# Patient Record
Sex: FEMALE | Race: WHITE | Employment: OTHER | URBAN - METROPOLITAN AREA
[De-identification: names, ages, dates, MRNs, and addresses within clinical notes are randomized per-mention and may not be internally consistent; named-entity substitution may affect disease eponyms.]

---

## 2017-01-21 ENCOUNTER — OFFICE VISIT (OUTPATIENT)
Dept: FAMILY MEDICINE CLINIC | Facility: CLINIC | Age: 27
End: 2017-01-21

## 2017-01-21 VITALS
DIASTOLIC BLOOD PRESSURE: 72 MMHG | WEIGHT: 291.81 LBS | OXYGEN SATURATION: 98 % | SYSTOLIC BLOOD PRESSURE: 114 MMHG | HEIGHT: 67 IN | HEART RATE: 72 BPM | RESPIRATION RATE: 16 BRPM | TEMPERATURE: 98 F | BODY MASS INDEX: 45.8 KG/M2

## 2017-01-21 DIAGNOSIS — J01.01 ACUTE RECURRENT MAXILLARY SINUSITIS: Primary | ICD-10-CM

## 2017-01-21 PROCEDURE — 99213 OFFICE O/P EST LOW 20 MIN: CPT | Performed by: NURSE PRACTITIONER

## 2017-01-21 RX ORDER — AMOXICILLIN 875 MG/1
875 TABLET, COATED ORAL 2 TIMES DAILY
Qty: 20 TABLET | Refills: 0 | Status: SHIPPED | OUTPATIENT
Start: 2017-01-21 | End: 2017-01-31

## 2017-01-21 NOTE — PROGRESS NOTES
CHIEF COMPLAINT:   Patient presents with:  Sinusitis: SINUS PRESSURE, HA , COUGH, SORE THROAT  X 2 WKS       HPI:   Adalberto Rocha is a 31year old female who presents for sinus congestion for  2  weeks. Symptoms have been worsening since onset. Smoking Status: Never Smoker                      Alcohol Use: No                  REVIEW OF SYSTEMS:   GENERAL: feels well otherwise, no unplanned weight change and normal appetite  SKIN: no rashes or abnormal skin lesions  HEENT: See HPI.     LUNGS: den Sig: Take 1 tablet (875 mg total) by mouth 2 (two) times daily. Risks, benefits, side effects of medication addressed and explained. Patient Instructions     Sinus Headache    The sinuses are air-filled spaces within the bones of the face. · You may use over-the-counter decongestants unless a similar medicine was prescribed. Nasal sprays or drops work the fastest. Use one that contains phenylephrine or oxymetazoline. First blow your nose gently to remove mucus. Then apply the spray or drops. · You may use over-the-counter medicine to control pain and fever, unless another pain medicine was prescribed. Talk with your doctor before using acetaminophen or ibuprofen if you have chronic liver or kidney disease.  Also talk with your doctor if you hav

## 2017-01-21 NOTE — PATIENT INSTRUCTIONS
Sinus Headache    The sinuses are air-filled spaces within the bones of the face. They connect to the inside of the nose. Sinusitis is an inflammation of the tissue lining the sinus cavity.  Sinus inflammation can occur during a cold or hay fever (allergi · You may use over-the-counter decongestants unless a similar medicine was prescribed. Nasal sprays or drops work the fastest. Use one that contains phenylephrine or oxymetazoline. First blow your nose gently to remove mucus. Then apply the spray or drops. · You may use over-the-counter medicine to control pain and fever, unless another pain medicine was prescribed. Talk with your doctor before using acetaminophen or ibuprofen if you have chronic liver or kidney disease.  Also talk with your doctor if you hav

## 2017-02-01 ENCOUNTER — OFFICE VISIT (OUTPATIENT)
Dept: FAMILY MEDICINE CLINIC | Facility: CLINIC | Age: 27
End: 2017-02-01

## 2017-02-01 VITALS
HEIGHT: 67 IN | SYSTOLIC BLOOD PRESSURE: 130 MMHG | RESPIRATION RATE: 19 BRPM | HEART RATE: 100 BPM | DIASTOLIC BLOOD PRESSURE: 70 MMHG | BODY MASS INDEX: 45.67 KG/M2 | WEIGHT: 291 LBS | TEMPERATURE: 98 F | OXYGEN SATURATION: 99 %

## 2017-02-01 DIAGNOSIS — J01.00 ACUTE MAXILLARY SINUSITIS, RECURRENCE NOT SPECIFIED: Primary | ICD-10-CM

## 2017-02-01 PROCEDURE — 99213 OFFICE O/P EST LOW 20 MIN: CPT | Performed by: NURSE PRACTITIONER

## 2017-02-01 RX ORDER — DOXYCYCLINE HYCLATE 100 MG/1
100 CAPSULE ORAL 2 TIMES DAILY
Qty: 20 CAPSULE | Refills: 0 | Status: SHIPPED | OUTPATIENT
Start: 2017-02-01 | End: 2017-02-11

## 2017-02-01 NOTE — PROGRESS NOTES
CHIEF COMPLAINT:   Patient presents with:  Sinus Problem: seen 1/21, not feeling better       HPI:   Eusebia Cordova is a 31year old female who presents for sinus congestion for  3  weeks. Symptoms have been consistent since onset.  Amoxicillin no Smoking Status: Never Smoker                      Alcohol Use: No                  REVIEW OF SYSTEMS:   GENERAL: feels well otherwise, no unplanned weight change,  Good appetite  SKIN: no rashes or abnormal skin lesions  HEENT: See HPI.     LUNGS: denies Meds & Refills for this Visit:    Signed Prescriptions Disp Refills    Doxycycline Hyclate 100 MG Oral Cap 20 capsule 0      Sig: Take 1 capsule (100 mg total) by mouth 2 (two) times daily.            Risks, benefits, side effects of medication addressed · Female only: If taking birth control - Use back up birth control for pregnancy prevention for up to 1 month due to antibiotic use                Sinusitis (Antibiotic Treatment)    The sinuses are air-filled spaces within the bones of the face.  They conn · Over-the-counter antihistamines may help if allergies contributed to your sinusitis.    · Do not use nasal rinses or irrigation during an acute sinus infection, unless told to by your health care provider.  Rinsing may spread the infection to other sinuse

## 2017-02-19 ENCOUNTER — OFFICE VISIT (OUTPATIENT)
Dept: FAMILY MEDICINE CLINIC | Facility: CLINIC | Age: 27
End: 2017-02-19

## 2017-02-19 VITALS
SYSTOLIC BLOOD PRESSURE: 112 MMHG | RESPIRATION RATE: 20 BRPM | HEIGHT: 67 IN | OXYGEN SATURATION: 99 % | TEMPERATURE: 98 F | BODY MASS INDEX: 45.67 KG/M2 | DIASTOLIC BLOOD PRESSURE: 68 MMHG | WEIGHT: 291 LBS | HEART RATE: 85 BPM

## 2017-02-19 DIAGNOSIS — L20.9 ATOPIC DERMATITIS, UNSPECIFIED TYPE: Primary | ICD-10-CM

## 2017-02-19 PROCEDURE — 99213 OFFICE O/P EST LOW 20 MIN: CPT | Performed by: NURSE PRACTITIONER

## 2017-02-19 NOTE — PROGRESS NOTES
CHIEF COMPLAINT:   Patient presents with:  Derm Problem: itchy dry patches, worst when in water on Left hand at knuckles/ arm  for 1 month  Redness         HPI:   Elizabeth Farr is a 31year old female who presents for evaluation of a rash.   Per Problem Relation Age of Onset   • Cancer Father      lung ca,  at 47   • Diabetes Neg    • Heart Disorder Neg    • Hypertension Neg    • Obesity Mother    • asthma[other] [OTHER] Mother         Smoking Status: Never Smoker                      Alcohol Meds & Refills for this Visit:    Signed Prescriptions Disp Refills    hydrocortisone 2.5 % External Cream 28 g 0      Sig: Apply 1 Application topically 2 (two) times daily. Risk and benefits of medication discussed.        Patient Instructions · Use oral diphenhydramine to help reduce itching. This is an antihistamine you can buy at drug and grocery stores. It can make you sleepy, so use lower doses during the daytime. Or you can use loratadine.  This is an antihistamine that will not make you sl

## 2017-06-05 ENCOUNTER — OFFICE VISIT (OUTPATIENT)
Dept: FAMILY MEDICINE CLINIC | Facility: CLINIC | Age: 27
End: 2017-06-05

## 2017-06-05 VITALS
BODY MASS INDEX: 45.67 KG/M2 | WEIGHT: 291 LBS | OXYGEN SATURATION: 98 % | DIASTOLIC BLOOD PRESSURE: 52 MMHG | RESPIRATION RATE: 20 BRPM | SYSTOLIC BLOOD PRESSURE: 104 MMHG | TEMPERATURE: 98 F | HEIGHT: 67 IN | HEART RATE: 90 BPM

## 2017-06-05 DIAGNOSIS — J01.11 ACUTE RECURRENT FRONTAL SINUSITIS: Primary | ICD-10-CM

## 2017-06-05 PROCEDURE — 99213 OFFICE O/P EST LOW 20 MIN: CPT | Performed by: NURSE PRACTITIONER

## 2017-06-05 RX ORDER — AMOXICILLIN 875 MG/1
875 TABLET, COATED ORAL 2 TIMES DAILY
Qty: 20 TABLET | Refills: 0 | Status: SHIPPED | OUTPATIENT
Start: 2017-06-05 | End: 2017-06-15

## 2017-06-05 NOTE — PROGRESS NOTES
HPI:   Fransisca Mcmanus is a 31year old female who presents with ill symptoms for  10  days. Patient reports sore throat, congestion, sinus pain, OTC cold meds have not been helping, prior history of sinusitis, denies fever, denies cough.  Fortunato pereira normal  NEURO: admits to headaches    EXAM:   /52 mmHg  Pulse 90  Temp(Src) 97.7 °F (36.5 °C) (Oral)  Resp 20  Ht 67\"  Wt 291 lb  BMI 45.57 kg/m2  SpO2 98%  LMP 05/17/2017  GENERAL: well developed, well nourished,in no apparent distress  HEENT: atra 2 weeks with primary care if not better or sooner if worsening symptoms. Seek immediate care if inability to swallow or breathe. ·       The patient indicates understanding of these issues and agrees to the plan.

## 2017-06-05 NOTE — PATIENT INSTRUCTIONS
·  PLAN: Amoxicillin, take as directed. Finish all the medication even if you feel better. · Probiotics or yogurt daily during antibiotic use will help decrease stomach upset and restore good bacteria to the gut.   · Use Flonase or other steroid nasal spr

## 2017-07-05 ENCOUNTER — OFFICE VISIT (OUTPATIENT)
Dept: FAMILY MEDICINE CLINIC | Facility: CLINIC | Age: 27
End: 2017-07-05

## 2017-07-05 DIAGNOSIS — J01.11 ACUTE RECURRENT FRONTAL SINUSITIS: Primary | ICD-10-CM

## 2017-07-05 DIAGNOSIS — J01.01 ACUTE RECURRENT MAXILLARY SINUSITIS: ICD-10-CM

## 2017-07-05 PROCEDURE — 99213 OFFICE O/P EST LOW 20 MIN: CPT | Performed by: NURSE PRACTITIONER

## 2017-07-05 RX ORDER — CEPHALEXIN 500 MG/1
500 CAPSULE ORAL 4 TIMES DAILY
Qty: 40 CAPSULE | Refills: 0 | Status: SHIPPED | OUTPATIENT
Start: 2017-07-05 | End: 2017-08-22 | Stop reason: ALTCHOICE

## 2017-07-05 NOTE — PATIENT INSTRUCTIONS
Understanding Sinus Problems    You don’t often think about your sinuses until there’s a problem. One day you realize you can’t smell dinner cooking. Or you find you often have headaches or problems breathing through your nose.   Symptoms of sinus problem

## 2017-07-07 VITALS
RESPIRATION RATE: 16 BRPM | SYSTOLIC BLOOD PRESSURE: 120 MMHG | DIASTOLIC BLOOD PRESSURE: 72 MMHG | TEMPERATURE: 98 F | HEART RATE: 68 BPM | OXYGEN SATURATION: 98 %

## 2017-07-07 NOTE — PROGRESS NOTES
CHIEF COMPLAINT:   \" Patient presents with:  Sinusitis: Persistent    HPI:   Lynn Reynoso is a 28year old female who was seen in this clinic on 6/26/17 and prescribed Amoxicillin for an Acute Sinusitis.   Pt states that she started to feel bet lung ca,  at 46   • Diabetes Neg    • Heart Disorder Neg    • Hypertension Neg    • Obesity Mother    • asthma[other] [OTHER] Mother       Smoking status: Never Smoker                                                              Smokeless tobacco: Disp Refills    cephALEXin 500 MG Oral Cap 40 capsule 0      Sig: Take 1 capsule (500 mg total) by mouth 4 (four) times daily.            Imaging & Consults:  None

## 2017-08-22 ENCOUNTER — OFFICE VISIT (OUTPATIENT)
Dept: FAMILY MEDICINE CLINIC | Facility: CLINIC | Age: 27
End: 2017-08-22

## 2017-08-22 VITALS
HEART RATE: 69 BPM | TEMPERATURE: 98 F | OXYGEN SATURATION: 98 % | WEIGHT: 286.38 LBS | HEIGHT: 67 IN | DIASTOLIC BLOOD PRESSURE: 66 MMHG | SYSTOLIC BLOOD PRESSURE: 110 MMHG | BODY MASS INDEX: 44.95 KG/M2

## 2017-08-22 DIAGNOSIS — J30.9 ALLERGIC RHINITIS, UNSPECIFIED CHRONICITY, UNSPECIFIED SEASONALITY, UNSPECIFIED TRIGGER: ICD-10-CM

## 2017-08-22 DIAGNOSIS — J34.89 SINUS PRESSURE: Primary | ICD-10-CM

## 2017-08-22 PROCEDURE — 99213 OFFICE O/P EST LOW 20 MIN: CPT | Performed by: NURSE PRACTITIONER

## 2017-08-22 RX ORDER — FLUTICASONE PROPIONATE 50 MCG
2 SPRAY, SUSPENSION (ML) NASAL DAILY
COMMUNITY
End: 2020-01-14 | Stop reason: ALTCHOICE

## 2017-08-22 RX ORDER — FLUTICASONE PROPIONATE 50 MCG
1 SPRAY, SUSPENSION (ML) NASAL 2 TIMES DAILY
Qty: 1 BOTTLE | Refills: 1 | Status: SHIPPED | OUTPATIENT
Start: 2017-08-22 | End: 2017-09-21

## 2017-08-22 RX ORDER — LORATADINE 10 MG/1
10 TABLET ORAL DAILY
Qty: 90 TABLET | Refills: 0 | Status: SHIPPED | OUTPATIENT
Start: 2017-08-22 | End: 2017-11-20

## 2017-08-22 NOTE — PATIENT INSTRUCTIONS
· Please start Flonase 1 spray each nostril twice daily before brushing teeth. Use for at least one to two weeks. May stop if improving. Restart if symptoms return. · Start Loratadine 10 mg daily for at least two weeks.    · Use saline nasal spray during t

## 2017-08-22 NOTE — PROGRESS NOTES
HPI:   Deepthi Arellano is a 28year old female who presents with ill symptoms for  1  weeks. Patient has been seen for sinus pressure twice in the past two months with no relief with antibiotic use.  Today she reports sinus pressure over left eye, Used                      Alcohol use:  No                  REVIEW OF SYSTEMS:   GENERAL: feels well otherwise, fatigue as above  HEENT: congested, as above in HPI  LUNGS: denies shortness of breath with exertion  CARDIOVASCULAR: denies chest pain on exerti needed to help blow nose easily. · Use warm pack to face where pressure is to help with pain. · May take Tylenol 650 mg every six hours as needed for pain. · Avoid decongestants as they may be too drying.  Use salt water gargles as discussed to remove p

## 2017-08-25 ENCOUNTER — CHARTING TRANS (OUTPATIENT)
Dept: OTHER | Age: 27
End: 2017-08-25

## 2017-11-20 ENCOUNTER — OFFICE VISIT (OUTPATIENT)
Dept: FAMILY MEDICINE CLINIC | Facility: CLINIC | Age: 27
End: 2017-11-20

## 2017-11-20 VITALS
OXYGEN SATURATION: 98 % | RESPIRATION RATE: 20 BRPM | TEMPERATURE: 98 F | BODY MASS INDEX: 43.35 KG/M2 | HEART RATE: 81 BPM | WEIGHT: 286 LBS | DIASTOLIC BLOOD PRESSURE: 64 MMHG | SYSTOLIC BLOOD PRESSURE: 108 MMHG | HEIGHT: 68 IN

## 2017-11-20 DIAGNOSIS — J01.40 ACUTE PANSINUSITIS, RECURRENCE NOT SPECIFIED: Primary | ICD-10-CM

## 2017-11-20 PROCEDURE — 99213 OFFICE O/P EST LOW 20 MIN: CPT | Performed by: NURSE PRACTITIONER

## 2017-11-20 RX ORDER — AMOXICILLIN AND CLAVULANATE POTASSIUM 875; 125 MG/1; MG/1
1 TABLET, FILM COATED ORAL 2 TIMES DAILY
Qty: 20 TABLET | Refills: 0 | Status: SHIPPED | OUTPATIENT
Start: 2017-11-20 | End: 2017-11-30

## 2017-11-21 NOTE — PROGRESS NOTES
CHIEF COMPLAINT:   Patient presents with:  Sinus Problem: s/s for 2-3 weeks      HPI:   Juan Rodriguez is a 28year old female who presents for cold symptoms for  3  weeks.  Symptoms have progressed into sinus congestion and been worsening since o Comment: w/ adenoidectomy   Family History   Problem Relation Age of Onset   • Cancer Father      lung ca,  at 47   • Diabetes Neg    • Heart Disorder Neg    • Hypertension Neg    • Obesity Mother    • asthma[other] [OTHER] Mother       Smoking sta PLAN: Meds as below.   Comfort care instructions as listed in Patient Instructions    Meds & Refills for this Visit:    Signed Prescriptions Disp Refills    Amoxicillin-Pot Clavulanate 875-125 MG Oral Tab 20 tablet 0      Sig: Take 1 tablet by mouth 2 (two) · Over-the-counter decongestants may be used unless a similar medicine was prescribed. Nasal sprays work the fastest. Use one that contains phenylephrine or oxymetazoline. First blow the nose gently. Then use the spray.  Do not use these medicines more ofte © 5740-8019 [employer] [address] [facility], [facility] [place]. All rights reserved. This information is not intended as a substitute for professional medical care. Always follow your healthcare professional's instructions.             The

## 2018-01-06 ENCOUNTER — OFFICE VISIT (OUTPATIENT)
Dept: FAMILY MEDICINE CLINIC | Facility: CLINIC | Age: 28
End: 2018-01-06

## 2018-01-06 VITALS
OXYGEN SATURATION: 98 % | TEMPERATURE: 98 F | HEART RATE: 86 BPM | SYSTOLIC BLOOD PRESSURE: 110 MMHG | DIASTOLIC BLOOD PRESSURE: 68 MMHG | BODY MASS INDEX: 44.89 KG/M2 | RESPIRATION RATE: 20 BRPM | HEIGHT: 67 IN | WEIGHT: 286 LBS

## 2018-01-06 DIAGNOSIS — J32.9 RHINOSINUSITIS: Primary | ICD-10-CM

## 2018-01-06 DIAGNOSIS — J31.0 RHINOSINUSITIS: Primary | ICD-10-CM

## 2018-01-06 DIAGNOSIS — R51.9 SINUS HEADACHE: ICD-10-CM

## 2018-01-06 PROCEDURE — 99213 OFFICE O/P EST LOW 20 MIN: CPT | Performed by: NURSE PRACTITIONER

## 2018-01-06 RX ORDER — PREDNISONE 20 MG/1
20 TABLET ORAL 2 TIMES DAILY
Qty: 10 TABLET | Refills: 0 | Status: SHIPPED | OUTPATIENT
Start: 2018-01-06 | End: 2018-01-11

## 2018-01-06 NOTE — PATIENT INSTRUCTIONS
Use Flonase daily  Try a saline rinse  Follow up in 5 days if no improvement or worsening symptoms    Sinus Headaches     When using nasal spray, keep your chin down and angle the spray away from center.      Sinus headaches can cause a gnawing pain behin Drinking extra fluids helps thin your mucus. This lets it drain from your sinuses more easily. Have a glass of water every hour or two. A humidifier helps in much the same way. Fluids can also offset the drying effects of certain medicines.  If you use a hu

## 2018-01-07 NOTE — PROGRESS NOTES
CHIEF COMPLAINT:   Patient presents with:  Cold: s/s for 1 week  Sinus Problem      HPI:   Priyanka Quintero is a 28year old female who presents for cold symptoms for  1  weeks.  Symptoms have progressed into sinus congestion and been intermittent s Comment: s/p ear tubes  1992: TONSILLECTOMY      Comment: w/ adenoidectomy   Family History   Problem Relation Age of Onset   • Cancer Father      lung ca,  at 47   • Obesity Mother    • asthma [OTHER] Mother    • Diabetes Neg    • Heart Disorder N PLAN: Discussed with patient that symptoms do not look infectious at this time. Advised following up with PCP for evaluation of intermittent headaches and sinus issues. Will consider antibiotic in 5 days if no improvement in symptoms. Meds as below.   Ad © 8529-4317 [employer] [address]. [address] [facility] [place] [place], [address] [place]. All rights reserved. This information is not intended as a substitute for professional medical care. Always follow your healthcare professional's instructions.         [employer] © 5218-2581 [employer] [address]. [address] [facility], [facility] [place]. All rights reserved. This information is not intended as a substitute for professional medical care. Always follow your healthcare professional's instructions.             The

## 2018-01-11 ENCOUNTER — CHARTING TRANS (OUTPATIENT)
Dept: OTHER | Age: 28
End: 2018-01-11

## 2018-01-11 ENCOUNTER — LAB SERVICES (OUTPATIENT)
Dept: OTHER | Age: 28
End: 2018-01-11

## 2018-01-11 LAB
APPEARANCE: CLEAR
BILIRUBIN: NEGATIVE
COLOR: YELLOW
GLUCOSE U: NEGATIVE
KETONES: NEGATIVE
LEUKOCYTES: NORMAL
NITRITE: NEGATIVE
OCCULT BLOOD: NORMAL
PH: 5
UROBILINOGEN: 0.2

## 2018-01-14 LAB — BACTERIA UR CULT: NORMAL

## 2018-01-22 ENCOUNTER — LAB SERVICES (OUTPATIENT)
Dept: OTHER | Age: 28
End: 2018-01-22

## 2018-01-22 ENCOUNTER — CHARTING TRANS (OUTPATIENT)
Dept: OTHER | Age: 28
End: 2018-01-22

## 2018-01-22 LAB
APPEARANCE: NORMAL
BILIRUBIN: NEGATIVE
COLOR: YELLOW
KETONES: NEGATIVE
LEUKOCYTES: NORMAL
NITRITE: NEGATIVE
OCCULT BLOOD: NEGATIVE
PH: 5
PROTEIN: NEGATIVE
URINE SPEC GRAVITY: 1.02
UROBILINOGEN: 0.2

## 2018-01-22 ASSESSMENT — PAIN SCALES - GENERAL: PAINLEVEL_OUTOF10: 4

## 2018-01-24 ENCOUNTER — CHARTING TRANS (OUTPATIENT)
Dept: OTHER | Age: 28
End: 2018-01-24

## 2018-01-25 LAB — BACTERIA UR CULT: NORMAL

## 2018-03-14 ENCOUNTER — CHARTING TRANS (OUTPATIENT)
Dept: OTHER | Age: 28
End: 2018-03-14

## 2018-09-02 ENCOUNTER — CHARTING TRANS (OUTPATIENT)
Dept: OTHER | Age: 28
End: 2018-09-02

## 2018-09-02 ENCOUNTER — LAB SERVICES (OUTPATIENT)
Dept: OTHER | Age: 28
End: 2018-09-02

## 2018-09-02 LAB
APPEARANCE: CLEAR
BILIRUBIN: NEGATIVE
COLOR: YELLOW
GLUCOSE U: NEGATIVE
KETONES: NEGATIVE
LEUKOCYTES: NEGATIVE
NITRITE: NEGATIVE
OCCULT BLOOD: NEGATIVE
PH: 6
PROTEIN: NEGATIVE
URINE SPEC GRAVITY: 1.02
UROBILINOGEN: 0.2

## 2018-09-02 ASSESSMENT — PAIN SCALES - GENERAL: PAINLEVEL_OUTOF10: 4

## 2018-09-06 LAB — BACTERIA UR CULT: NORMAL

## 2018-10-07 ENCOUNTER — CHARTING TRANS (OUTPATIENT)
Dept: OTHER | Age: 28
End: 2018-10-07

## 2018-11-01 VITALS
DIASTOLIC BLOOD PRESSURE: 76 MMHG | TEMPERATURE: 98.6 F | SYSTOLIC BLOOD PRESSURE: 126 MMHG | HEART RATE: 72 BPM | RESPIRATION RATE: 16 BRPM | OXYGEN SATURATION: 98 %

## 2018-11-02 VITALS
SYSTOLIC BLOOD PRESSURE: 124 MMHG | OXYGEN SATURATION: 98 % | TEMPERATURE: 98.1 F | HEART RATE: 76 BPM | RESPIRATION RATE: 16 BRPM | DIASTOLIC BLOOD PRESSURE: 60 MMHG

## 2018-11-02 VITALS
RESPIRATION RATE: 16 BRPM | HEART RATE: 80 BPM | WEIGHT: 184.99 LBS | TEMPERATURE: 100 F | SYSTOLIC BLOOD PRESSURE: 118 MMHG | BODY MASS INDEX: 29.03 KG/M2 | DIASTOLIC BLOOD PRESSURE: 80 MMHG | OXYGEN SATURATION: 99 % | HEIGHT: 67 IN

## 2018-11-03 VITALS
RESPIRATION RATE: 14 BRPM | TEMPERATURE: 98.1 F | HEART RATE: 68 BPM | DIASTOLIC BLOOD PRESSURE: 78 MMHG | SYSTOLIC BLOOD PRESSURE: 120 MMHG

## 2018-11-15 ENCOUNTER — NURSE ONLY (OUTPATIENT)
Dept: FAMILY MEDICINE CLINIC | Facility: CLINIC | Age: 28
End: 2018-11-15
Payer: MEDICARE

## 2018-11-15 VITALS
SYSTOLIC BLOOD PRESSURE: 126 MMHG | DIASTOLIC BLOOD PRESSURE: 80 MMHG | OXYGEN SATURATION: 99 % | TEMPERATURE: 98 F | WEIGHT: 293 LBS | HEART RATE: 74 BPM | BODY MASS INDEX: 48 KG/M2 | RESPIRATION RATE: 16 BRPM

## 2018-11-15 DIAGNOSIS — J02.9 SORE THROAT: Primary | ICD-10-CM

## 2018-11-15 DIAGNOSIS — J01.90 ACUTE VIRAL SINUSITIS: ICD-10-CM

## 2018-11-15 DIAGNOSIS — B97.89 ACUTE VIRAL SINUSITIS: ICD-10-CM

## 2018-11-15 PROCEDURE — 99213 OFFICE O/P EST LOW 20 MIN: CPT | Performed by: NURSE PRACTITIONER

## 2018-11-15 PROCEDURE — 87880 STREP A ASSAY W/OPTIC: CPT | Performed by: NURSE PRACTITIONER

## 2018-11-15 NOTE — PATIENT INSTRUCTIONS
Humidifier in room  Sleep propped  Push fluids  Limit dairy  Mucinex as directed  Sudafed as needed  Flonase daily  Benadryl at night as needed  Follow up in 5-7 days for worsening symptoms or no improvment    Sinusitis (No Antibiotics)    The sinuses ar · Over-the-counter antihistamines may help if allergies contributed to your sinusitis.    · Use acetaminophen or ibuprofen to control pain, unless another pain medicine was prescribed to you.  If you have chronic liver or kidney disease or ever had a stomac

## 2018-11-16 NOTE — PROGRESS NOTES
CHIEF COMPLAINT:   Patient presents with:  Sore Throat: sore throat, feverish, head congestion, headaches, x 2 days       HPI:   Satya Moy is a 33year old female who presents for upper respiratory symptoms for  2 days.  Patient reports sore /80 (BP Location: Left arm, Patient Position: Sitting, Cuff Size: adult)   Pulse 74   Temp 98 °F (36.7 °C) (Oral)   Resp 16   Wt (!) 300 lb 12.8 oz   LMP 11/01/2018   SpO2 99%   BMI 48.06 kg/m²   GENERAL: well developed, well nourished,in no apparent The sinuses are air-filled spaces within the bones of the face. They connect to the inside of the nose. Sinusitis is an inflammation of the tissue that lines the sinuses.  Sinusitis can occur during a cold. It can also happen due to allergies to pollens and · Use acetaminophen or ibuprofen to control pain, unless another pain medicine was prescribed to you. If you have chronic liver or kidney disease or ever had a stomach ulcer, talk with your healthcare provider before using these medicines.  (Aspirin should

## 2018-11-23 ENCOUNTER — HOSPITAL ENCOUNTER (OUTPATIENT)
Age: 28
Discharge: HOME | End: 2018-11-23
Attending: EMERGENCY MEDICINE
Payer: MEDICARE

## 2018-11-23 VITALS
SYSTOLIC BLOOD PRESSURE: 126 MMHG | RESPIRATION RATE: 20 BRPM | HEART RATE: 73 BPM | TEMPERATURE: 98 F | OXYGEN SATURATION: 97 % | DIASTOLIC BLOOD PRESSURE: 64 MMHG

## 2018-11-23 DIAGNOSIS — J01.91 ACUTE RECURRENT SINUSITIS, UNSPECIFIED LOCATION: Primary | ICD-10-CM

## 2018-11-23 PROCEDURE — 99204 OFFICE O/P NEW MOD 45 MIN: CPT

## 2018-11-23 PROCEDURE — 87081 CULTURE SCREEN ONLY: CPT | Performed by: EMERGENCY MEDICINE

## 2018-11-23 PROCEDURE — 87430 STREP A AG IA: CPT | Performed by: EMERGENCY MEDICINE

## 2018-11-23 PROCEDURE — 87430 STREP A AG IA: CPT

## 2018-11-23 PROCEDURE — 99214 OFFICE O/P EST MOD 30 MIN: CPT

## 2018-11-23 RX ORDER — BENZONATATE 100 MG/1
100 CAPSULE ORAL 3 TIMES DAILY PRN
Qty: 30 CAPSULE | Refills: 0 | Status: SHIPPED | OUTPATIENT
Start: 2018-11-23 | End: 2018-12-23

## 2018-11-23 RX ORDER — AMOXICILLIN AND CLAVULANATE POTASSIUM 875; 125 MG/1; MG/1
1 TABLET, FILM COATED ORAL 2 TIMES DAILY
Qty: 20 TABLET | Refills: 0 | Status: SHIPPED | OUTPATIENT
Start: 2018-11-23 | End: 2018-12-03

## 2018-11-23 NOTE — ED INITIAL ASSESSMENT (HPI)
Pt was treated for sinus infection, felt better and then flew on a plane recently;  Pt with sore throat\, congestion, sinus headache, cough x 1 week; tactile fever on Sunday

## 2018-11-23 NOTE — ED PROVIDER NOTES
Patient Seen in: [facility]    History   Patient presents with:  Cough/URI    Stated Complaint: cough / congestion / sorethroat    HPI    30-year-old female presents to the emergency department for evaluation of a several day hi atraumatic. Anicteric sclera. Oral mucosa is moist.  Oropharynx is minimally injected without exudates. Tympanic membranes reveal bilateral middle ear effusions without injection. Nasal mucosa is boggy. There is no facial tenderness or swelling.   Neck

## 2018-11-27 VITALS
WEIGHT: 293 LBS | DIASTOLIC BLOOD PRESSURE: 54 MMHG | HEART RATE: 96 BPM | RESPIRATION RATE: 16 BRPM | HEIGHT: 67 IN | BODY MASS INDEX: 45.99 KG/M2 | SYSTOLIC BLOOD PRESSURE: 116 MMHG | TEMPERATURE: 98.4 F

## 2018-11-27 VITALS
HEART RATE: 82 BPM | SYSTOLIC BLOOD PRESSURE: 118 MMHG | TEMPERATURE: 98.6 F | HEIGHT: 67 IN | WEIGHT: 293 LBS | DIASTOLIC BLOOD PRESSURE: 68 MMHG | OXYGEN SATURATION: 95 % | RESPIRATION RATE: 16 BRPM | BODY MASS INDEX: 45.99 KG/M2

## 2019-07-03 ENCOUNTER — OFFICE VISIT (OUTPATIENT)
Dept: FAMILY MEDICINE CLINIC | Facility: CLINIC | Age: 29
End: 2019-07-03
Payer: MEDICARE

## 2019-07-03 VITALS
WEIGHT: 280 LBS | TEMPERATURE: 99 F | BODY MASS INDEX: 43.95 KG/M2 | DIASTOLIC BLOOD PRESSURE: 72 MMHG | HEIGHT: 67 IN | OXYGEN SATURATION: 99 % | RESPIRATION RATE: 20 BRPM | SYSTOLIC BLOOD PRESSURE: 118 MMHG | HEART RATE: 79 BPM

## 2019-07-03 DIAGNOSIS — J01.40 ACUTE PANSINUSITIS, RECURRENCE NOT SPECIFIED: Primary | ICD-10-CM

## 2019-07-03 PROCEDURE — 99213 OFFICE O/P EST LOW 20 MIN: CPT | Performed by: NURSE PRACTITIONER

## 2019-07-03 RX ORDER — AMOXICILLIN AND CLAVULANATE POTASSIUM 875; 125 MG/1; MG/1
1 TABLET, FILM COATED ORAL 2 TIMES DAILY
Qty: 20 TABLET | Refills: 0 | Status: SHIPPED | OUTPATIENT
Start: 2019-07-03 | End: 2019-07-13

## 2019-07-03 NOTE — PROGRESS NOTES
CHIEF COMPLAINT:   Patient presents with:  Sinus Problem: s/s for 2 weeks. OTC meds taken      HPI:   Fransisca Mcmanus is a 26year old female who presents for cold symptoms for  1  weeks.  Symptoms have progressed into sinus congestion and been wo • Obesity Mother    • Other (asthma) Mother    • Diabetes Neg    • Heart Disorder Neg    • Hypertension Neg       Social History    Tobacco Use      Smoking status: Never Smoker      Smokeless tobacco: Never Used    Alcohol use: No      Alcohol/week: 0.0 o • Amoxicillin-Pot Clavulanate 875-125 MG Oral Tab 20 tablet 0     Sig: Take 1 tablet by mouth 2 (two) times daily for 10 days. Risks, benefits, side effects of medication addressed and explained.     Patient Instructions     Humidifier in room  Sleep · You can use an over-the-counter decongestant, unless a similar medicine was prescribed to you. Nasal sprays work the fastest. Use one that contains phenylephrine or oxymetazoline. First blow your nose gently. Then use the spray.  Do not use these medicine · Don’t have close contact with people who have sore throats, colds, or other upper respiratory infections. · Don’t smoke, and stay away from secondhand smoke. · Stay up to date with of your vaccines.   Date Last Reviewed: 11/1/2017  © 4725-8563 [employer]

## 2019-07-03 NOTE — PATIENT INSTRUCTIONS
Humidifier in room  Sleep propped  Push fluids  Limit dairy  Mucinex as directed  Sudafed as needed  FLonase nasal spray daily  Benadryl at night as needed    Sinusitis (Antibiotic Treatment)    The sinuses are air-filled spaces within the bones of the f · You can use an over-the-counter decongestant, unless a similar medicine was prescribed to you. Nasal sprays work the fastest. Use one that contains phenylephrine or oxymetazoline. First blow your nose gently. Then use the spray.  Do not use these medicine · Don’t have close contact with people who have sore throats, colds, or other upper respiratory infections. · Don’t smoke, and stay away from secondhand smoke. · Stay up to date with of your vaccines.   Date Last Reviewed: 11/1/2017  © 7327-5363 [employer]

## 2019-07-28 ENCOUNTER — OFFICE VISIT (OUTPATIENT)
Dept: FAMILY MEDICINE CLINIC | Facility: CLINIC | Age: 29
End: 2019-07-28
Payer: MEDICARE

## 2019-07-28 VITALS
RESPIRATION RATE: 20 BRPM | SYSTOLIC BLOOD PRESSURE: 112 MMHG | HEART RATE: 91 BPM | BODY MASS INDEX: 43.95 KG/M2 | TEMPERATURE: 98 F | HEIGHT: 67 IN | WEIGHT: 280 LBS | DIASTOLIC BLOOD PRESSURE: 58 MMHG | OXYGEN SATURATION: 98 %

## 2019-07-28 DIAGNOSIS — L84 CALLUS OF FOOT: Primary | ICD-10-CM

## 2019-07-28 PROCEDURE — 99213 OFFICE O/P EST LOW 20 MIN: CPT | Performed by: NURSE PRACTITIONER

## 2019-07-28 NOTE — PATIENT INSTRUCTIONS
Treating Corns and Calluses  If your corns or calluses are mild, reducing friction may help. Different shoes, moleskin patches, or soft pads may be all the treatment you need. In more severe cases, treating tissue buildup may require your doctor’s care. © 9097-7639 [employer] [address]. [address] [facility] [place] [facility] [place] [address] [place]. All rights reserved. This information is not intended as a substitute for professional medical care. Always follow your healthcare professional's instructions.         Urea sk Side effects that usually do not require medical attention (report to your doctor or health care professional if they continue or are bothersome):  · skin rash  · stinging, or irritation  What may interact with this medicine?   Interactions are not expected

## 2019-07-28 NOTE — PROGRESS NOTES
CHIEF COMPLAINT:   Patient presents with: Foot Pain: Right foot, peeling/itchy s/s for 1 week No OTC meds used      HPI:    Joanie Norris is a 26year old female who presents for evaluation of a rash.   Per patient rash started in the past 7 day sees Dr Georgina Gutierrez in [place]   • Hyperlipemia 11/13/2009   • HYPERLIPIDEMIA    • Morbid obesity ([address] 10/2/2008   • OCD (obsessive compulsive disorder) 6/8/2010      Past Surgical History:   Procedure Laterality Date   • OTHER SURGICAL HISTORY CARDIO: RRR without murmur  LYMPH: No lymphadenopathy. ASSESSMENT AND PLAN:   Joanie Norris is a 26year old female who presents for evaluation of a rash.  Findings are consistent with:    ASSESSMENT:  Callus of foot  (primary encounter diagn Orthotics are specially made to meet the needs of your feet. They cushion calluses or divert pressure away from these problem areas. Worn as directed, orthotics help limit existing problems and prevent new ones from forming.   If you need surgery  If a bone This medicine is for external use only. Do not take by mouth. Follow the directions on the label. Apply a thin film to the affected area.  The moisturizing effect may be better if this medicine is applied while the skin is still damp after washing or bathin · broken, inflamed, or burnt skin  · infection  · an unusual or allergic reaction to urea, other medicines, foods, dyes, or preservatives  · pregnant or trying to get pregnant  · breast-feeding  What should I watch for while using this medicine?   Tell your

## 2019-08-23 DIAGNOSIS — L84 CALLUS OF FOOT: ICD-10-CM

## 2019-08-26 RX ORDER — UREA 40 %
CREAM (GRAM) TOPICAL
Qty: 85 G | Refills: 0 | OUTPATIENT
Start: 2019-08-26

## 2019-09-10 ENCOUNTER — OFFICE VISIT (OUTPATIENT)
Dept: FAMILY MEDICINE CLINIC | Facility: CLINIC | Age: 29
End: 2019-09-10
Payer: MEDICARE

## 2019-09-10 VITALS
WEIGHT: 284.31 LBS | OXYGEN SATURATION: 98 % | HEIGHT: 67 IN | DIASTOLIC BLOOD PRESSURE: 60 MMHG | TEMPERATURE: 99 F | BODY MASS INDEX: 44.62 KG/M2 | SYSTOLIC BLOOD PRESSURE: 124 MMHG | HEART RATE: 91 BPM

## 2019-09-10 DIAGNOSIS — J02.9 SORE THROAT: Primary | ICD-10-CM

## 2019-09-10 LAB
CONTROL LINE PRESENT WITH A CLEAR BACKGROUND (YES/NO): YES YES/NO
STREP GRP A CUL-SCR: NEGATIVE

## 2019-09-10 PROCEDURE — 87880 STREP A ASSAY W/OPTIC: CPT | Performed by: NURSE PRACTITIONER

## 2019-09-10 PROCEDURE — 99213 OFFICE O/P EST LOW 20 MIN: CPT | Performed by: NURSE PRACTITIONER

## 2019-09-10 NOTE — PATIENT INSTRUCTIONS
· Continue allergy medication-use Flonase 1 spray each nostril twice daily before brushing teeth. Use until first frost or during your allergy season. · Consider adding allergy pill such as Allegra or Claritin.  Avoid decongestants at this time as they can

## 2019-09-10 NOTE — PROGRESS NOTES
HPI:   Tennille Ricci is a 26year old female who presents with ill symptoms for  3  days. Patient reports sore throat with painful swallow, sinus pressure that worsens with lying down, mild ear pressure, denies productive cough.  Has tried [facility] [employer] Smoker      Smokeless tobacco: Never Used    Alcohol use: No      Alcohol/week: 0.0 standard drinks    Drug use: No        REVIEW OF SYSTEMS:   GENERAL: feels well otherwise, was recently at oral surgeon for root canal, taking PCN and pain medication as ne · Continue allergy medication-use Flonase 1 spray each nostril twice daily before brushing teeth. Use until first frost or during your allergy season. · Consider adding allergy pill such as Allegra or Claritin.  Avoid decongestants at this time as they c

## 2020-01-14 ENCOUNTER — OFFICE VISIT (OUTPATIENT)
Dept: FAMILY MEDICINE CLINIC | Facility: CLINIC | Age: 30
End: 2020-01-14
Payer: MEDICARE

## 2020-01-14 VITALS
DIASTOLIC BLOOD PRESSURE: 66 MMHG | HEIGHT: 67 IN | SYSTOLIC BLOOD PRESSURE: 108 MMHG | TEMPERATURE: 98 F | BODY MASS INDEX: 43.16 KG/M2 | HEART RATE: 87 BPM | WEIGHT: 275 LBS | OXYGEN SATURATION: 98 %

## 2020-01-14 DIAGNOSIS — B97.89 ACUTE VIRAL SINUSITIS: Primary | ICD-10-CM

## 2020-01-14 DIAGNOSIS — J01.90 ACUTE VIRAL SINUSITIS: Primary | ICD-10-CM

## 2020-01-14 PROCEDURE — 99213 OFFICE O/P EST LOW 20 MIN: CPT | Performed by: NURSE PRACTITIONER

## 2020-01-14 RX ORDER — FLUTICASONE PROPIONATE 50 MCG
2 SPRAY, SUSPENSION (ML) NASAL DAILY
Qty: 1 BOTTLE | Refills: 0 | Status: SHIPPED | OUTPATIENT
Start: 2020-01-14 | End: 2020-01-28

## 2020-01-14 NOTE — PROGRESS NOTES
CHIEF COMPLAINT:   Patient presents with:  Cold: headache, stuffy nose, sinus pressure, x 1 week. HPI:     Lynn Reynoso is a 26year old female who presents for cold symptoms for  1  weeks.  Symptoms have progressed into sinus congestion a Obesity Mother    • Other (asthma) Mother    • Diabetes Neg    • Heart Disorder Neg    • Hypertension Neg       Social History    Tobacco Use      Smoking status: Never Smoker      Smokeless tobacco: Never Used    Alcohol use: No      Alcohol/week: 0.0 sta Prescriptions Disp Refills   • Fluticasone Propionate 50 MCG/ACT Nasal Suspension 1 Bottle 0     Si sprays by Each Nare route daily for 14 days. Risks, benefits, side effects of medication addressed and explained.     There are no Patient Instruct

## 2020-01-17 ENCOUNTER — TELEPHONE (OUTPATIENT)
Dept: FAMILY MEDICINE CLINIC | Facility: CLINIC | Age: 30
End: 2020-01-17

## 2020-01-17 DIAGNOSIS — J01.40 ACUTE PANSINUSITIS, RECURRENCE NOT SPECIFIED: Primary | ICD-10-CM

## 2020-01-17 RX ORDER — DOXYCYCLINE HYCLATE 100 MG
100 TABLET ORAL 2 TIMES DAILY
Qty: 14 TABLET | Refills: 0 | Status: SHIPPED | OUTPATIENT
Start: 2020-01-17 | End: 2020-01-24

## 2020-01-17 NOTE — TELEPHONE ENCOUNTER
Patient was seen 3 days ago by me for viral sinusitis. States symptoms and pressure are worsening. Hx of multiple sinus infections. Will start patient on Doxycycline. Advised to follow up for worsening symptoms or no improvement.   Patient agrees, no fu

## 2021-08-24 ENCOUNTER — HOSPITAL ENCOUNTER (OUTPATIENT)
Age: 31
Discharge: HOME | End: 2021-08-24
Payer: MEDICARE

## 2021-08-24 VITALS
TEMPERATURE: 99 F | RESPIRATION RATE: 17 BRPM | HEART RATE: 75 BPM | BODY MASS INDEX: 43.16 KG/M2 | DIASTOLIC BLOOD PRESSURE: 61 MMHG | HEIGHT: 67 IN | OXYGEN SATURATION: 98 % | WEIGHT: 275 LBS | SYSTOLIC BLOOD PRESSURE: 117 MMHG

## 2021-08-24 DIAGNOSIS — L24.5 IRRITANT CONTACT DERMATITIS DUE TO OTHER CHEMICAL PRODUCTS: Primary | ICD-10-CM

## 2021-08-24 PROCEDURE — 99213 OFFICE O/P EST LOW 20 MIN: CPT

## 2021-08-25 NOTE — ED PROVIDER NOTES
Patient Seen in: [facility]      History   Patient presents with:  Rash    Stated Complaint: RASH AFTER USING INDUSTRIAL SEALMENT    HPI/Subjective:   HPI    34-year-old female presents to the immediate care with her mother for concerns a noted in HPI. Constitutional and vital signs reviewed. All other systems reviewed and negative except as noted above.     Physical Exam     ED Triage Vitals [08/24/21 2012]   /61   Pulse 75   Resp 17   Temp 98.5 °F (36.9 °C)   Temp src Oral   Sp doctor. Tylenol/Motrin for discomfort. Verbal care instructions given, verbal return instructions given.                              Disposition and Plan     Clinical Impression:  Irritant contact dermatitis due to other chemical products  (primary encou

## 2023-01-02 ENCOUNTER — HOSPITAL ENCOUNTER (OUTPATIENT)
Age: 33
Discharge: HOME | End: 2023-01-02
Payer: MEDICARE

## 2023-01-02 VITALS
TEMPERATURE: 98 F | DIASTOLIC BLOOD PRESSURE: 79 MMHG | SYSTOLIC BLOOD PRESSURE: 138 MMHG | RESPIRATION RATE: 18 BRPM | WEIGHT: 275 LBS | BODY MASS INDEX: 43 KG/M2 | HEART RATE: 75 BPM | OXYGEN SATURATION: 100 %

## 2023-01-02 DIAGNOSIS — J01.90 ACUTE NON-RECURRENT SINUSITIS, UNSPECIFIED LOCATION: Primary | ICD-10-CM

## 2023-01-02 PROCEDURE — 99214 OFFICE O/P EST MOD 30 MIN: CPT

## 2023-01-02 PROCEDURE — 99213 OFFICE O/P EST LOW 20 MIN: CPT

## 2023-01-02 RX ORDER — DOXYCYCLINE HYCLATE 100 MG/1
100 CAPSULE ORAL 2 TIMES DAILY
Qty: 20 CAPSULE | Refills: 0 | Status: SHIPPED | OUTPATIENT
Start: 2023-01-02 | End: 2023-01-12

## 2023-01-02 NOTE — DISCHARGE INSTRUCTIONS
Rest and drink plenty of fluids. Start the antibiotics and make sure to finish the entire prescription. Get Zyrtec-D, Claritin-D or Allegra-D from behind the pharmacy counter. This will help with the sinus pressure/pain. Take Tylenol and/or ibuprofen as needed for pain. Try warm, moist compresses to also help with pressure. Follow up with your PCP in 7 days if not improved.

## 2024-03-14 ENCOUNTER — OFFICE VISIT (OUTPATIENT)
Dept: FAMILY MEDICINE CLINIC | Facility: CLINIC | Age: 34
End: 2024-03-14
Payer: MEDICARE

## 2024-03-14 VITALS
DIASTOLIC BLOOD PRESSURE: 58 MMHG | OXYGEN SATURATION: 98 % | RESPIRATION RATE: 20 BRPM | WEIGHT: 281.81 LBS | BODY MASS INDEX: 45.29 KG/M2 | HEART RATE: 78 BPM | TEMPERATURE: 98 F | HEIGHT: 66 IN | SYSTOLIC BLOOD PRESSURE: 102 MMHG

## 2024-03-14 DIAGNOSIS — E66.01 MORBID OBESITY (HCC): ICD-10-CM

## 2024-03-14 DIAGNOSIS — F84.5 ASPERGER'S DISORDER (HCC): ICD-10-CM

## 2024-03-14 DIAGNOSIS — Z00.00 ENCOUNTER FOR ANNUAL HEALTH EXAMINATION: Primary | ICD-10-CM

## 2024-03-14 DIAGNOSIS — F33.0 MDD (MAJOR DEPRESSIVE DISORDER), RECURRENT EPISODE, MILD (HCC): ICD-10-CM

## 2024-03-14 DIAGNOSIS — F42.9 OBSESSIVE-COMPULSIVE DISORDER, UNSPECIFIED TYPE: ICD-10-CM

## 2024-03-14 DIAGNOSIS — Z11.59 NEED FOR HEPATITIS C SCREENING TEST: ICD-10-CM

## 2024-03-14 DIAGNOSIS — Z23 NEED FOR TDAP VACCINATION: ICD-10-CM

## 2024-03-14 DIAGNOSIS — Z13.1 SCREENING FOR DIABETES MELLITUS (DM): ICD-10-CM

## 2024-03-14 DIAGNOSIS — E78.5 HYPERLIPIDEMIA LDL GOAL <160: ICD-10-CM

## 2024-03-14 DIAGNOSIS — J01.00 ACUTE NON-RECURRENT MAXILLARY SINUSITIS: ICD-10-CM

## 2024-03-14 DIAGNOSIS — Z13.0 SCREENING, ANEMIA, DEFICIENCY, IRON: ICD-10-CM

## 2024-03-14 DIAGNOSIS — F41.9 ANXIETY: ICD-10-CM

## 2024-03-14 DIAGNOSIS — J30.2 SEASONAL ALLERGIC RHINITIS, UNSPECIFIED TRIGGER: ICD-10-CM

## 2024-03-14 PROBLEM — F32.5 MAJOR DEPRESSION IN REMISSION (HCC): Status: ACTIVE | Noted: 2019-09-18

## 2024-03-14 PROBLEM — F42.4 HABITUAL SELF-EXCORIATION: Status: ACTIVE | Noted: 2023-02-16

## 2024-03-14 PROBLEM — F32.5 MAJOR DEPRESSION IN REMISSION: Status: ACTIVE | Noted: 2019-09-18

## 2024-03-14 RX ORDER — MONTELUKAST SODIUM 10 MG/1
10 TABLET ORAL DAILY
Qty: 90 TABLET | Refills: 3 | Status: SHIPPED | OUTPATIENT
Start: 2024-03-14 | End: 2025-03-09

## 2024-03-14 RX ORDER — FEXOFENADINE HCL 180 MG/1
180 TABLET ORAL DAILY
COMMUNITY

## 2024-03-14 RX ORDER — FLUTICASONE PROPIONATE 50 MCG
2 SPRAY, SUSPENSION (ML) NASAL DAILY
COMMUNITY

## 2024-03-14 RX ORDER — ATORVASTATIN CALCIUM 20 MG/1
20 TABLET, FILM COATED ORAL NIGHTLY
Qty: 90 TABLET | Refills: 1 | Status: SHIPPED | OUTPATIENT
Start: 2024-03-14

## 2024-03-14 RX ORDER — AMOXICILLIN 875 MG/1
875 TABLET, COATED ORAL 2 TIMES DAILY
Qty: 20 TABLET | Refills: 0 | Status: SHIPPED | OUTPATIENT
Start: 2024-03-14

## 2024-03-14 RX ORDER — ATORVASTATIN CALCIUM 20 MG/1
20 TABLET, FILM COATED ORAL NIGHTLY
COMMUNITY
End: 2024-03-14

## 2024-03-14 NOTE — PATIENT INSTRUCTIONS
Go to the ward lab for your fasting blood tests. Do not eat or drink except for water for at least 8 hours prior to the blood tests. Do not take any vitamins or Biotin for 3 days before your blood tests.    Take the Amoxil 875 mg one tablet with breakfast and dinner for 10 days for your sinuses. While on the antibiotics, eat yogurt or take a probiotic to help replenish the good bacteria in your intestines.    Take the Singular 10 mg nightly for your allergies.    Continue your medications as prescribed.    Make an appointment with the ENT doctor, Dr. Frias or one of his partners, for further evaluation of your sinuses if your symptoms do not improve after the Amoxil and the singulair.    Recommendations for exercise are 3-5 times weekly for 30-60 minutes for a minimum of 150-300 minutes.     For premenopausal women and men, 1000 mg of calcium daily is recommended. For postmenopausal women, 1200 mg of calcium daily is recommended.    To help the body absorb and use calcium, vitamin D 2000 international units daily is recommended.    You received the Tdap (tetanus, diptheria, pertussis-whooping cough) vaccines today. You may run a low grade fever, have mild redness or swelling at the site of the shot, muscle pain at the site of the shot for the next 2-3 days. You may take Tylenol or Ibuprofen as needed. Use your arm to help decrease pain and swelling. You can apply ice to any swelling for 10-15 minutes twice daily through clothing or a towel.    You can get the flu shot and the newest COVID booster at your local pharmacy.    I will contact you with your test results once available.    See me in 6 months for recheck on your cholesterol.        Marcelina Barber's SCREENING SCHEDULE   Tests on this list are recommended by your physician but may not be covered, or covered at this frequency, by your insurer.   Please check with your insurance carrier before scheduling to verify coverage.   PREVENTATIVE  SERVICES FREQUENCY &  COVERAGE DETAILS LAST COMPLETION DATE   Diabetes Screening    Fasting Blood Sugar /  Glucose    One screening every 12 months if never tested or if previously tested but not diagnosed with pre-diabetes   One screening every 6 months if diagnosed with pre-diabetes Lab Results   Component Value Date    GLUCOSE 102 (H) 10/15/2013    GLU 90 09/29/2018        Cardiovascular Disease Screening    Lipid Panel  Cholesterol  Lipoprotein (HDL)  Triglycerides Covered every 5 years for all Medicare beneficiaries without apparent signs or symptoms of cardiovascular disease Lab Results   Component Value Date    CHOLEST 120 09/29/2018    HDL 44 (L) 09/29/2018    LDL 71 09/29/2018    TRIG 24 09/29/2018         Electrocardiogram (EKG)   Covered if needed at Welcome to Medicare, and non-screening if indicated for medical reasons -      Ultrasound Screening for Abdominal Aortic Aneurysm (AAA) Covered once in a lifetime for one of the following risk factors    Men who are 65-75 years old and have ever smoked    Anyone with a family history -     Colorectal Cancer Screening  Covered for ages 50-85; only need ONE of the following:    Colonoscopy   Covered every 10 years    Covered every 2 years if patient is at high risk or previous colonoscopy was abnormal -    No recommendations at this time    Flexible Sigmoidoscopy   Covered every 4 years -    Fecal Occult Blood Test Covered annually -   Bone Density Screening    Bone density screening    Covered every 2 years after age 65 if diagnosed with risk of osteoporosis or estrogen deficiency.    Covered yearly for long-term glucocorticoid medication use (Steroids) No results found for this or any previous visit.      No recommendations at this time   Pap and Pelvic    Pap   Covered every 2 years for women at normal risk; Annually if at high risk -  Health Maintenance   Topic Date Due    Pap Smear  Never done       Chlamydia Annually if high risk -  No recommendations at  this time   Screening Mammogram    Mammogram     Recommend annually for all female patients aged 40 and older    One baseline mammogram covered for patients aged 35-39 -    No recommendations at this time    Immunizations    Influenza Covered once per flu season  Please get every year -  Influenza Vaccine(1) due on 10/01/2023    Pneumococcal Each vaccine ([id number] & [id number]) covered once after 65 Prevnar 13: -    [id number]: -     No recommendations at this time    Hepatitis B One screening covered for patients with certain risk factors   -  No recommendations at this time    Tetanus Toxoid Not covered by Medicare Part B unless medically necessary (cut with metal); may be covered with your pharmacy prescription benefits 01/01/2004    Tetanus, Diptheria and Pertusis TD and TDaP Not covered by Medicare Part B -  DTaP,Tdap,and Td Vaccines(2 - Tdap) due on 01/02/2004    Zoster Not covered by Medicare Part B; may be covered with your pharmacy  prescription benefits -  No recommendations at this time

## 2024-03-14 NOTE — PROGRESS NOTES
Subjective:   Marcelina Barber is a 33 year old female who presents for a Medicare Subsequent Annual Wellness visit (Pt already had Initial Annual Wellness) and scheduled follow up of multiple significant but stable problems.     This 33-year-old female who is a new patient to my practice presents to the office for her annual Medicare wellness exam and to establish care.    She has a history of hyperlipidemia and needs a refill on her atorvastatin 20 mg nightly.    She is currently taking Sertraline 100 mg and buspirone 30 mg bid.  She has history for major depressive disorder, Asperger's disorder, anxiety, OCD, habitual self excoriation.  The patient feels she is doing well on her medications but she would like to be referred to a new therapist and psychiatrist.  She denies any SI or HI.    The patient also has had worsening sinus pain and pressure for the last several months.  She says she is having daily headaches which is unrelieved by Tylenol.  She is not having any fever or chills.  She has no history of asthma with her allergies.  She is taking her Allegra and her Flonase but her symptoms have not been improving.  She would like to know if there is anything else she could take for her symptoms.  She is worried about a possible sinus infection.  She would like to be referred to an ENT physician.    The patient would like to get her flu shot and her Tdap booster today.  She has not had the most recent COVID-vaccine booster.    The patient has never had a Pap smear because she was unable to tolerate it.  She has never been sexually active.  Her menstrual cycles are monthly and without any problems.  She does not want to have a Pap smear.      History/Other:   Fall Risk Assessment:   She has been screened for Falls and is low risk.      Cognitive Assessment:   She had a completely normal cognitive assessment - see flowsheet entries     Functional Ability/Status:   Marcelina Barber has some  abnormal functions as listed below:  She has Vision problems based on screening of functional status.       Depression Screening (PHQ-2/PHQ-9): PHQ-9 TOTAL SCORE: 7  , done 3/14/2024   Trouble falling or staying asleep, or sleeping too much: 3     Feeling tired or having little energy: 3    Trouble concentrating on things, such as reading the newspaper or watching television: 1    If you checked off any problems, how difficult have these problems made it for you to do your work, take care of things at home, or get along with other people?: Not difficult at all    Last [place] Suicide Screening on 3/14/2024 was No Risk.     Advanced Directives:   She does NOT have a Living Will. [Do you have a living will?: No]  She does NOT have a Power of  for Health Care. [Do you have a healthcare power of ?: No]  Discussed Advance Care Planning with patient (and family/surrogate if present). Standard forms made available to patient in After Visit Summary.      Patient Active Problem List   Diagnosis    Morbid obesity (HCC)    Generalized hyperhidrosis    Anxiety    OCD (obsessive compulsive disorder)    Hyperlipidemia LDL goal <160    Asperger's disorder (HCC)    Habitual self-excoriation    MDD (major depressive disorder), recurrent episode, mild (HCC)     Allergies:  She has No Known Allergies.    Current Medications:  Outpatient Medications Marked as Taking for the 3/14/24 encounter (Office Visit) with Zonia Bonilla DO   Medication Sig    atorvastatin 20 MG Oral Tab Take 1 tablet (20 mg total) by mouth nightly.    fluticasone propionate 50 MCG/ACT Nasal Suspension 2 sprays by Each Nare route daily.    fexofenadine 180 MG Oral Tab Take 1 tablet (180 mg total) by mouth daily.    montelukast (SINGULAIR) 10 MG Oral Tab Take 1 tablet (10 mg total) by mouth daily.    busPIRone HCl 30 MG Oral Tab Take 1 tablet (30 mg total) by mouth 2 (two) times daily.    sertraline 100 MG Oral Tab 2 po qd       Medical  History:  She  has a past medical history of ANXIETY, DEPRESSION, Hyperlipemia (11/13/2009), HYPERLIPIDEMIA, Morbid obesity (HCC) (10/2/2008), and OCD (obsessive compulsive disorder) (6/8/2010).  Surgical History:  She  has a past surgical history that includes tonsillectomy (1992) and other surgical history.   Family History:  Her family history includes Cancer in her father; Obesity in her mother; asthma in her mother.  Social History:  She  reports that she has never smoked. She has never used smokeless tobacco. She reports that she does not drink alcohol and does not use drugs.    Works at [employer] in sales.    OB: FDLMP 3/4/2024. Monthly with out problems, never been sexually active. Unable to tolerate pap smear previously and does not want one.    Tobacco:  She has never smoked tobacco.    CAGE Alcohol Screen:   CAGE screening score of 0 on 3/14/2024, showing low risk of alcohol abuse.      Patient Care Team:  Elizabeth Wallace MD as PCP - General (Family Medicine)    Review of Systems  Negative as stated above.    Objective:   Physical Exam    /58 (BP Location: Right arm, Patient Position: Sitting)   Pulse 78   Temp 97.7 °F (36.5 °C)   Resp 20   Ht 5' 6\" (1.676 m)   Wt 281 lb 12.8 oz (127.8 kg)   LMP 03/04/2024 (Approximate)   SpO2 98%   BMI 45.48 kg/m²  Estimated body mass index is 45.48 kg/m² as calculated from the following:    Height as of this encounter: 5' 6\" (1.676 m).    Weight as of this encounter: 281 lb 12.8 oz (127.8 kg).    Wt Readings from Last 6 Encounters:   03/14/24 281 lb 12.8 oz (127.8 kg)   01/02/23 275 lb (124.7 kg)   12/27/21 275 lb (124.7 kg)   08/24/21 275 lb (124.7 kg)   06/21/20 265 lb (120.2 kg)   01/14/20 275 lb (124.7 kg)     Weight is up 6 # from 1/2/2023 OV Medicare Hearing Assessment:   Hearing Screening    Time taken: 3/14/2024  2:53 PM  Entry User: Zonia Bonilla DO  Screening Method: Whisper Test  Whisper Test Result: Pass         Visual Acuity:    Right Eye Visual Acuity: Corrected Right Eye Chart Acuity: 20/20   Left Eye Visual Acuity: Corrected Left Eye Chart Acuity: 20/20   Both Eyes Visual Acuity: Corrected Both Eyes Chart Acuity: 20/20   Able To Tolerate Visual Acuity: Yes      Patient declined chaperone for breast exam.    General: WH/Morbidly obese/WD, in NAD, A and O times 3  HEAD: Normocephalic, atraumatic  EYES: LOS, EOMI, conjunctiva normal, sclera anicteric  EARS: Tympanic membranes congested, EAC's normal.  NOSE: Turbninates normal, no bleeding noted.  PHARYNX:  No eythema or exudates, mucous membrane moist, airway patent.  NECK:  No cervical lymphadenopathy or thyromegaly, No bruits noted.  HEART: Regular rate and rhythm, no S3, S4 or murmur noted.  LUNGS: Clear to ausculation. No retractions or tachypnea noted.  BREASTS: without masses, discharge or retractions bilaterally. No axillary lymph nodes palpated.  ABDOMEN: Soft, obese, nontender, no guarding, rigidity or rebound, no masses or hepatosplenomegaly, normal bowel sounds in all four quadrants.  BACK: No tenderness over the thoracic or lumbar spine. No scoliosis noted.  EXTREMITIES: No clubbing, cyanosis, edema noted. DTR's +2/4 in all 4 extremities. Motor +5/5 in all 4 extremities.  SKIN: Warm and dry.  NEURO: Cr. N. II-XII intact, normal gait  PSYCH: Affect flat, answering questions appropriately.      Assessment & Plan:   Marcelina Barber is a 33 year old female who presents for a Medicare Assessment.     1. Encounter for annual health examination (Primary)    Discussed advanced directives with the patient. Forms given for patient to complete. She was reminded I would need a copy of the forms once completed for her chart.    2. Screening, anemia, deficiency, iron    -     CBC With Differential With Platelet; Future; Expected date: 03/14/2024    3. Screening for diabetes mellitus (DM)    -     Hemoglobin A1C; Future; Expected date: 03/14/2024    4. Need for hepatitis C  screening test    -     HCV Antibody; Future; Expected date: 03/14/2024    5. Hyperlipidemia LDL goal <160    Last lipid panel dated 9/29/2018 showed total cholesterol 120, HDL 44, LDL 71, triglycerides of 24.    She is due for recheck on her lipid panel and CMP.  I have filled her atorvastatin 20 mg nightly.    -     Lipid Panel; Future; Expected date: 03/14/2024  -     Comp Metabolic Panel (14); Future; Expected date: 03/14/2024  -     Atorvastatin Calcium; Take 1 tablet (20 mg total) by mouth nightly.  Dispense: 90 tablet; Refill: 1    6. MDD (major depressive disorder), recurrent episode, mild (HCC)  7. Asperger's disorder (HCC)  8. Anxiety  9. Obsessive-compulsive disorder, unspecified type    PHQ 9 score is 7, C-SSRS score is no risk.    The patient should continue with her sertraline and buspirone as prescribed.  I will check a TSH.  She was advised she would be contacted by a staff member from [facility] who would help assist her in finding a therapist and a new psychiatrist.          -     TSH W Reflex To Free T4; Future; Expected date: 03/14/2024  -     [facility] Navigator    10. Seasonal allergic rhinitis, unspecified trigger  11. Acute non-recurrent maxillary sinusitis    The patient should continue with her Allegra and Flonase and saline nasal spray.  I will start montelukast 10 mg nightly.  She is given a prescription for Amoxil 875 mg twice daily for 10 days.  I have referred her to Dr. Frias of ENT.    -     Montelukast Sodium; Take 1 tablet (10 mg total) by mouth daily.  Dispense: 90 tablet; Refill: 3  -     ENT Referral - In Network    12. Morbid obesity (HCC)    I encouraged the patient to increase her activity, monitor her diet and decrease her carbohydrates and increase her protein.  I will check a hemoglobin A1c to rule out prediabetes or diabetes.  She is referred to the weight loss clinic.    -     Hemoglobin A1C; Future; Expected date: 03/14/2024  -     Referral to Weight Loss  Clinic    13. Need for Tdap vaccination    Tdap booster was given in the office today.  Side effects were reviewed.  The patient was informed that we are currently out of flu vaccine in the office.  She stated she would get her flu shot at her local pharmacy.  I also encouraged the patient to get the newest COVID booster at her pharmacy.    -     TdaP (Adacel, Boostrix) [39385]    The patient indicates understanding of these issues and agrees to the plan.  Reinforced healthy diet, lifestyle, and exercise.      Return in 6 months (on 9/14/2024).     Zonia Bonilla DO, 3/14/2024     Supplementary Documentation:   General Health:  In the past six months, have you lost more than 10 pounds without trying?: 2 - No  Has your appetite been poor?: No  Type of Diet: Balanced  How does the patient maintain a good energy level?: Daily Walks;Other  How would you describe your daily physical activity?: Moderate  How would you describe your current health state?: Good  How do you maintain positive mental well-being?: Social Interaction;Puzzles;Games;Visiting Friends;Visiting Family  On a scale of 0 to 10, with 0 being no pain and 10 being severe pain, what is your pain level?: 6 - (Moderate)  In the past six months, have you experienced urine leakage?: 0-No  At any time do you feel concerned for the safety/well-being of yourself and/or your children, in your home or elsewhere?: Yes  Have you had any immunizations at another office such as Influenza, Hepatitis B, Tetanus, or Pneumococcal?: No       Marcelina Barber's SCREENING SCHEDULE   Tests on this list are recommended by your physician but may not be covered, or covered at this frequency, by your insurer.   Please check with your insurance carrier before scheduling to verify coverage.   PREVENTATIVE SERVICES FREQUENCY &  COVERAGE DETAILS LAST COMPLETION DATE   Diabetes Screening    Fasting Blood Sugar /  Glucose    One screening every 12 months if never tested or if  previously tested but not diagnosed with pre-diabetes   One screening every 6 months if diagnosed with pre-diabetes Lab Results   Component Value Date    GLUCOSE 102 (H) 10/15/2013    GLU 90 09/29/2018        Cardiovascular Disease Screening    Lipid Panel  Cholesterol  Lipoprotein (HDL)  Triglycerides Covered every 5 years for all Medicare beneficiaries without apparent signs or symptoms of cardiovascular disease Lab Results   Component Value Date    CHOLEST 120 09/29/2018    HDL 44 (L) 09/29/2018    LDL 71 09/29/2018    TRIG 24 09/29/2018         Electrocardiogram (EKG)   Covered if needed at Welcome to Medicare, and non-screening if indicated for medical reasons -      Ultrasound Screening for Abdominal Aortic Aneurysm (AAA) Covered once in a lifetime for one of the following risk factors    Men who are 65-75 years old and have ever smoked    Anyone with a family history -     Colorectal Cancer Screening  Covered for ages 50-85; only need ONE of the following:    Colonoscopy   Covered every 10 years    Covered every 2 years if patient is at high risk or previous colonoscopy was abnormal -    No recommendations at this time    Flexible Sigmoidoscopy   Covered every 4 years -    Fecal Occult Blood Test Covered annually -   Bone Density Screening    Bone density screening    Covered every 2 years after age 65 if diagnosed with risk of osteoporosis or estrogen deficiency.    Covered yearly for long-term glucocorticoid medication use (Steroids) No results found for this or any previous visit.      No recommendations at this time   Pap and Pelvic    Pap   Covered every 2 years for women at normal risk; Annually if at high risk -  Health Maintenance   Topic Date Due    Pap Smear  03/15/2025 (Originally 6/27/2010)       Chlamydia Annually if high risk -  No recommendations at this time   Screening Mammogram    Mammogram     Recommend annually for all female patients aged 40 and older    One baseline mammogram covered for  patients aged 35-39 -    No recommendations at this time    Immunizations    Influenza Covered once per flu season  Please get every year -  No recommendations at this time    Pneumococcal Each vaccine ([id number] & [id number]) covered once after 65 Prevnar 13: -    [id number]: -     No recommendations at this time    Hepatitis B One screening covered for patients with certain risk factors   -  No recommendations at this time    Tetanus Toxoid Not covered by Medicare Part B unless medically necessary (cut with metal); may be covered with your pharmacy prescription benefits 01/01/2004    Tetanus, Diptheria and Pertusis TD and TDaP Not covered by Medicare Part B -  DTaP,Tdap,and Td Vaccines(2 - Tdap) due on 01/02/2004    Zoster Not covered by Medicare Part B; may be covered with your pharmacy  prescription benefits -  No recommendations at this time          Total time: 60 minutes precharting, H&P, plan of care of which 40 minutes was spent addressing her medical conditions in addition to her wellness exam.    This dictation was performed with a verbal recognition program (DRAGON) and it was checked for errors. It is possible that there are still dictated errors within this office note. If so, please bring any errors to my attention for an addendum. All efforts were made to ensure that this office note is accurate

## 2024-03-15 ENCOUNTER — PATIENT MESSAGE (OUTPATIENT)
Dept: FAMILY MEDICINE CLINIC | Facility: CLINIC | Age: 34
End: 2024-03-15

## 2024-03-15 NOTE — TELEPHONE ENCOUNTER
I already placed an order for [facility] yesterday. They should be contacting her in the next couple of days. You can give her the [facility] phone number so she can call if she does not hear from them in one week.

## 2024-03-15 NOTE — TELEPHONE ENCOUNTER
From: Marcelina Barber  To: Zonia Bonilla  Sent: 3/15/2024 8:55 AM CDT  Subject: referral to a mental health doctor    I think we had a conversation about needing a referral to a mental health practice. Does [facility] have that? I'm looking for a new provider to manage my medication and provide counseling.

## 2024-03-18 ENCOUNTER — LAB ENCOUNTER (OUTPATIENT)
Dept: LAB | Age: 34
End: 2024-03-18
Attending: FAMILY MEDICINE
Payer: MEDICARE

## 2024-03-18 DIAGNOSIS — E66.01 MORBID OBESITY (HCC): ICD-10-CM

## 2024-03-18 DIAGNOSIS — F33.0 MDD (MAJOR DEPRESSIVE DISORDER), RECURRENT EPISODE, MILD (HCC): ICD-10-CM

## 2024-03-18 DIAGNOSIS — Z13.0 SCREENING, ANEMIA, DEFICIENCY, IRON: ICD-10-CM

## 2024-03-18 DIAGNOSIS — Z11.59 NEED FOR HEPATITIS C SCREENING TEST: ICD-10-CM

## 2024-03-18 DIAGNOSIS — E78.5 HYPERLIPIDEMIA LDL GOAL <160: ICD-10-CM

## 2024-03-18 DIAGNOSIS — Z13.1 SCREENING FOR DIABETES MELLITUS (DM): ICD-10-CM

## 2024-03-18 LAB
ALBUMIN SERPL-MCNC: 3.6 G/DL (ref 3.4–5)
ALBUMIN/GLOB SERPL: 1.2 {RATIO} (ref 1–2)
ALP LIVER SERPL-CCNC: 75 U/L
ALT SERPL-CCNC: 21 U/L
ANION GAP SERPL CALC-SCNC: 3 MMOL/L (ref 0–18)
AST SERPL-CCNC: 28 U/L (ref 15–37)
BASOPHILS # BLD AUTO: 0.04 X10(3) UL (ref 0–0.2)
BASOPHILS NFR BLD AUTO: 0.5 %
BILIRUB SERPL-MCNC: 0.5 MG/DL (ref 0.1–2)
BUN BLD-MCNC: 9 MG/DL (ref 9–23)
CALCIUM BLD-MCNC: 9.4 MG/DL (ref 8.5–10.1)
CHLORIDE SERPL-SCNC: 110 MMOL/L (ref 98–112)
CHOLEST SERPL-MCNC: 142 MG/DL (ref ?–200)
CO2 SERPL-SCNC: 26 MMOL/L (ref 21–32)
CREAT BLD-MCNC: 0.57 MG/DL
EGFRCR SERPLBLD CKD-EPI 2021: 122 ML/MIN/1.73M2 (ref 60–?)
EOSINOPHIL # BLD AUTO: 0.32 X10(3) UL (ref 0–0.7)
EOSINOPHIL NFR BLD AUTO: 4.3 %
ERYTHROCYTE [DISTWIDTH] IN BLOOD BY AUTOMATED COUNT: 12.5 %
EST. AVERAGE GLUCOSE BLD GHB EST-MCNC: 111 MG/DL (ref 68–126)
FASTING PATIENT LIPID ANSWER: YES
FASTING STATUS PATIENT QL REPORTED: YES
GLOBULIN PLAS-MCNC: 2.9 G/DL (ref 2.8–4.4)
GLUCOSE BLD-MCNC: 99 MG/DL (ref 70–99)
HBA1C MFR BLD: 5.5 % (ref ?–5.7)
HCT VFR BLD AUTO: 39.3 %
HCV AB SERPL QL IA: NONREACTIVE
HDLC SERPL-MCNC: 42 MG/DL (ref 40–59)
HGB BLD-MCNC: 12.7 G/DL
IMM GRANULOCYTES # BLD AUTO: 0.01 X10(3) UL (ref 0–1)
IMM GRANULOCYTES NFR BLD: 0.1 %
LDLC SERPL CALC-MCNC: 90 MG/DL (ref ?–100)
LYMPHOCYTES # BLD AUTO: 3.34 X10(3) UL (ref 1–4)
LYMPHOCYTES NFR BLD AUTO: 44.5 %
MCH RBC QN AUTO: 32 PG (ref 26–34)
MCHC RBC AUTO-ENTMCNC: 32.3 G/DL (ref 31–37)
MCV RBC AUTO: 99 FL
MONOCYTES # BLD AUTO: 0.59 X10(3) UL (ref 0.1–1)
MONOCYTES NFR BLD AUTO: 7.9 %
NEUTROPHILS # BLD AUTO: 3.2 X10 (3) UL (ref 1.5–7.7)
NEUTROPHILS # BLD AUTO: 3.2 X10(3) UL (ref 1.5–7.7)
NEUTROPHILS NFR BLD AUTO: 42.7 %
NONHDLC SERPL-MCNC: 100 MG/DL (ref ?–130)
OSMOLALITY SERPL CALC.SUM OF ELEC: 287 MOSM/KG (ref 275–295)
PLATELET # BLD AUTO: 239 10(3)UL (ref 150–450)
POTASSIUM SERPL-SCNC: 4 MMOL/L (ref 3.5–5.1)
PROT SERPL-MCNC: 6.5 G/DL (ref 6.4–8.2)
RBC # BLD AUTO: 3.97 X10(6)UL
SODIUM SERPL-SCNC: 139 MMOL/L (ref 136–145)
TRIGL SERPL-MCNC: 43 MG/DL (ref 30–149)
TSI SER-ACNC: 0.87 MIU/ML (ref 0.36–3.74)
VLDLC SERPL CALC-MCNC: 7 MG/DL (ref 0–30)
WBC # BLD AUTO: 7.5 X10(3) UL (ref 4–11)

## 2024-03-18 PROCEDURE — 85025 COMPLETE CBC W/AUTO DIFF WBC: CPT

## 2024-03-18 PROCEDURE — 36415 COLL VENOUS BLD VENIPUNCTURE: CPT

## 2024-03-18 PROCEDURE — 80061 LIPID PANEL: CPT

## 2024-03-18 PROCEDURE — 83036 HEMOGLOBIN GLYCOSYLATED A1C: CPT

## 2024-03-18 PROCEDURE — 84443 ASSAY THYROID STIM HORMONE: CPT

## 2024-03-18 PROCEDURE — 80053 COMPREHEN METABOLIC PANEL: CPT

## 2024-03-18 PROCEDURE — 86803 HEPATITIS C AB TEST: CPT

## 2024-03-26 ENCOUNTER — TELEPHONE (OUTPATIENT)
Age: 34
End: 2024-03-26

## 2024-03-29 ENCOUNTER — TELEPHONE (OUTPATIENT)
Age: 34
End: 2024-03-29

## 2024-03-29 NOTE — TELEPHONE ENCOUNTER
Psychiatry Resources:    Yaya Morfin MD  [address]  [place] [address]  Phone: [phone]    Garfield Guillory MD  [address]  [place] [address]  Phone: [phone]    Harlan Alonso MD  [address]  [place] [address]  Phone: [phone]    Therapy Resources:    Rylee Naik  [address]  [place] [address]  Phone: [phone]    Hina Pisano  [address]  [place] [address]  Phone: [phone], option 2    Joslyn Kumar   [address]  [place] [address]  Phone: [phone]    In-Home Counseling (May offer phone appointments)  Phone: [phone]

## 2024-04-01 ENCOUNTER — TELEPHONE (OUTPATIENT)
Dept: FAMILY MEDICINE CLINIC | Facility: CLINIC | Age: 34
End: 2024-04-01

## 2024-04-01 NOTE — TELEPHONE ENCOUNTER
On 3-29-24, the following referrals for therapy and psychiatry were provided to the patient:     Psychiatry Resources:     Yaya Morfin MD   [address]   [place] [address]   Phone: [phone]     Garfield Guillory MD   [address]   [place] [address]   Phone: [phone]     Harlan Alonso MD   [address]   [place] [address]   Phone: [phone]     Therapy Resources:     Rylee Naik   [address]   [place] [address]   Phone: [phone]     Hina Pisano   [address]   [place] [address]   Phone: [phone], option 2     Joslyn Kumar   [address]   [place] [address]   Phone: [phone]     In-Home Counseling (May offer phone appointments)   Phone: [phone]       I have provided my contact information if additional resources are needed.     I am closing the order at this time. Please feel free to re-refer the patient for navigation as needed.   Thank you,     Julieta Azul M.S., MAXIMILIAN, [facility]   [email or web address]

## 2024-08-31 DIAGNOSIS — E78.5 HYPERLIPIDEMIA LDL GOAL <160: ICD-10-CM

## 2024-09-03 RX ORDER — ATORVASTATIN CALCIUM 20 MG/1
20 TABLET, FILM COATED ORAL NIGHTLY
Qty: 90 TABLET | Refills: 0 | Status: SHIPPED | OUTPATIENT
Start: 2024-09-03

## 2024-12-09 DIAGNOSIS — E78.5 HYPERLIPIDEMIA LDL GOAL <160: ICD-10-CM

## 2024-12-10 RX ORDER — ATORVASTATIN CALCIUM 20 MG/1
20 TABLET, FILM COATED ORAL NIGHTLY
Qty: 90 TABLET | Refills: 0 | Status: SHIPPED | OUTPATIENT
Start: 2024-12-10

## 2025-02-11 ENCOUNTER — OFFICE VISIT (OUTPATIENT)
Dept: FAMILY MEDICINE CLINIC | Facility: CLINIC | Age: 35
End: 2025-02-11
Payer: MEDICARE

## 2025-02-11 VITALS
WEIGHT: 273 LBS | DIASTOLIC BLOOD PRESSURE: 64 MMHG | SYSTOLIC BLOOD PRESSURE: 98 MMHG | OXYGEN SATURATION: 99 % | RESPIRATION RATE: 18 BRPM | HEART RATE: 73 BPM | BODY MASS INDEX: 43.87 KG/M2 | HEIGHT: 66 IN | TEMPERATURE: 98 F

## 2025-02-11 DIAGNOSIS — F41.9 ANXIETY: ICD-10-CM

## 2025-02-11 DIAGNOSIS — F42.9 OBSESSIVE-COMPULSIVE DISORDER, UNSPECIFIED TYPE: ICD-10-CM

## 2025-02-11 DIAGNOSIS — Z01.818 PREOP EXAMINATION: Primary | ICD-10-CM

## 2025-02-11 DIAGNOSIS — F33.0 MDD (MAJOR DEPRESSIVE DISORDER), RECURRENT EPISODE, MILD: ICD-10-CM

## 2025-02-11 DIAGNOSIS — E66.01 MORBID OBESITY (HCC): ICD-10-CM

## 2025-02-11 DIAGNOSIS — J30.2 SEASONAL ALLERGIC RHINITIS, UNSPECIFIED TRIGGER: ICD-10-CM

## 2025-02-11 DIAGNOSIS — J34.1 MUCOCELE OF ETHMOID SINUS: ICD-10-CM

## 2025-02-11 DIAGNOSIS — F84.5 ASPERGER'S DISORDER (HCC): ICD-10-CM

## 2025-02-11 DIAGNOSIS — E78.5 HYPERLIPIDEMIA LDL GOAL <160: ICD-10-CM

## 2025-02-11 DIAGNOSIS — G44.011 INTRACTABLE EPISODIC CLUSTER HEADACHE: ICD-10-CM

## 2025-02-11 DIAGNOSIS — Z28.21 REFUSED INFLUENZA VACCINE: ICD-10-CM

## 2025-02-11 PROCEDURE — 99214 OFFICE O/P EST MOD 30 MIN: CPT | Performed by: FAMILY MEDICINE

## 2025-02-11 PROCEDURE — G2211 COMPLEX E/M VISIT ADD ON: HCPCS | Performed by: FAMILY MEDICINE

## 2025-02-11 RX ORDER — MONTELUKAST SODIUM 10 MG/1
10 TABLET ORAL DAILY
Qty: 90 TABLET | Refills: 3 | Status: SHIPPED | OUTPATIENT
Start: 2025-02-11 | End: 2026-02-06

## 2025-02-11 RX ORDER — ATORVASTATIN CALCIUM 20 MG/1
20 TABLET, FILM COATED ORAL NIGHTLY
Qty: 90 TABLET | Refills: 3 | Status: SHIPPED | OUTPATIENT
Start: 2025-02-11

## 2025-02-11 RX ORDER — LORAZEPAM 0.5 MG/1
0.5 TABLET ORAL DAILY PRN
COMMUNITY
Start: 2025-02-05

## 2025-02-11 NOTE — PROGRESS NOTES
The 21st Century Cures Act makes medical notes like these available to patients in the interest of transparency. Please be advised this is a medical document. Medical documents are intended to carry relevant information, facts as evident, and the clinical opinion of the practitioner. The medical note is intended as peer to peer communication and may appear blunt or direct. It is written in medical language and may contain abbreviations or verbiage that are unfamiliar.     Marcelina Barber is a 34 year old female who presents for a pre-operative physical exam. Patient is to have Ethmoid mucocele excision, to be done by Dr. Milton Horner at [facility] on 2/28/2025.      Prior anesthesia Yes, no complications  PMH negative for angina, arrhythmia,, CAD, CHF, liver disease, kidney disease, no coagulation delay, no primary hypercoagulability, no PE or DVT history, no jaw problems, no asthma, no seizure disorder.  Smoking history No  Alcohol use No  LLOYD risk No    No family history of adverse reaction to anesthesia, no aneurysm, no bleeding problems, no clotting disorder, no sudden cardiac death, no stroke or ischemic heart disease    HPI:   Pt complains of chronic sinus headache for 1 year.  She had been seen by Dr. Horner.  CT scan showed a right sinus mass.  The patient is still having intermittent headaches.  She is not currently having any fever, chills, cough, sore throat, runny nose, nasal congestion, ear pain.  She does take Allegra, Flonase and Singulair without any improvement in the headache.  She is not having any visual changes.  She is denying any chest pain, palpitations, shortness of breath, dizziness, syncope, leg swelling, abdominal pain, nausea, vomiting, diarrhea, melena, hematochezia, dysuria, hematuria.  She is hoping when she has the surgery, her headaches will improve.  She takes Tylenol and ibuprofen as needed for the headache.    She has history for major depressive disorder,  Asperger's disorder, anxiety, OCD and is under the care of her psychiatrist who she sees every 3 months.  She is currently taking Zoloft 200 mg daily and BuSpar 30 mg twice daily and was given a prescription for lorazepam 0.5 mg to be taken daily as needed for anxiety.  She states she has not needed to take any of the lorazepam.  She feels her symptoms are controlled with her current medications.    She does have a history for hyperlipidemia and is currently taking atorvastatin 20 mg nightly.  She has no past history for hypertension, diabetes, heart disease or stroke.    The patient is declining her flu shot and COVID booster today.      Current Outpatient Medications   Medication Sig Dispense Refill    montelukast (SINGULAIR) 10 MG Oral Tab Take 1 tablet (10 mg total) by mouth daily. 90 tablet 3    atorvastatin 20 MG Oral Tab Take 1 tablet (20 mg total) by mouth nightly. 90 tablet 3    LORazepam 0.5 MG Oral Tab Take 1 tablet (0.5 mg total) by mouth daily as needed.      fluticasone propionate 50 MCG/ACT Nasal Suspension 2 sprays by Each Nare route daily.      fexofenadine 180 MG Oral Tab Take 1 tablet (180 mg total) by mouth daily.      busPIRone HCl 30 MG Oral Tab Take 1 tablet (30 mg total) by mouth 2 (two) times daily. 60 tablet 0    sertraline 100 MG Oral Tab 2 po qd 60 tablet 0      Allergies: Allergies[1]   Past Medical History:    Allergic rhinitis    ANXIETY    sees Dr Berta Hagan in [place]    Anxiety    DEPRESSION    sees Dr Berta Hagan in [place]    Hyperlipemia    HYPERLIPIDEMIA    Hyperlipidemia    Morbid obesity (HCC)    Obesity    OCD (obsessive compulsive disorder)      Past Surgical History:   Procedure Laterality Date    Other surgical history      s/p ear tubes    Tonsillectomy      w/ adenoidectomy      Family History   Problem Relation Age of Onset    Cancer Father         lung ca,  at 54    Obesity Father     Psychiatric Father         bipolar    Obesity Mother     Other  (asthma) Mother     Asthma Mother     Hypertension Mother     Asthma Brother     Obesity Brother     Dementia Maternal Grandfather     Dementia Maternal Grandmother     Diabetes Neg     Heart Disorder Neg       Social History:   Social History     Socioeconomic History    Marital status: Single   Tobacco Use    Smoking status: Never    Smokeless tobacco: Never   Vaping Use    Vaping status: Never Used   Substance and Sexual Activity    Alcohol use: No    Drug use: No    Sexual activity: Never   Other Topics Concern    Caffeine Concern No    Exercise Yes    Seat Belt Yes    Special Diet No    Stress Concern No    Weight Concern Yes     Service No    Blood Transfusions No    Occupational Exposure No    Hobby Hazards No    Sleep Concern No    Back Care No    Bike Helmet No    Self-Exams No   Social History Narrative    occup: [employer] [place] [employer], marital status: single, children: no    tobacco: never, etoh: no, illicits: no    sexually active: never, contraception: n/a     Social Drivers of Health      [facility] [employer] [employer]    [facility]           REVIEW OF SYSTEMS:     ROS is negative except as stated in HPI.    EXAM:   BP 98/64 (BP Location: Left arm, Patient Position: Sitting, Cuff Size: large)   Pulse 73   Temp 98.3 °F (36.8 °C)   Resp 18   Ht 5' 6\" (1.676 m)   Wt 273 lb (123.8 kg)   LMP 01/23/2025 (Approximate)   SpO2 99%   Breastfeeding No   BMI 44.06 kg/m²     Wt Readings from Last 6 Encounters:   02/11/25 273 lb (123.8 kg)   03/14/24 281 lb 12.8 oz (127.8 kg)   01/02/23 275 lb (124.7 kg)   12/27/21 275 lb (124.7 kg)   08/24/21 275 lb (124.7 kg)   06/21/20 265 lb (120.2 kg)     Weight is up 8 # from 6/21/2020 OV    General: WH/Obese/WD, in NAD, A and O times 3  HEAD: Normocephalic, atraumatic  EYES: LOS, EOMI, conjunctiva normal, sclera anicteric  EARS: Tympanic membranes normal, EAC's normal.  NOSE: Turbninates normal, no bleeding noted.  PHARYNX:  No eythema  or exudates, mucous membrane moist, airway patent.  NECK:  No cervical lymphadenopathy or thyromegaly, No bruits noted.  HEART: Regular rate and rhythm, no S3, S4 or murmur noted.  LUNGS: Clear to ausculation. No retractions or tachypnea noted.  ABDOMEN: Soft, obese, nontender, no guarding, rigidity or rebound, no masses or hepatosplenomegaly, normal bowel sounds in all four quadrants.  EXTREMITIES: No clubbing, cyanosis, edema noted. DTR's +2/4 in all 4 extremities. Motor +5/5 in all 4 extremities.  SKIN: Warm and dry.  NEURO: Cr. N. II-XII intact, normal gait  PSYCH: Normal mood and affect.      ASSESSMENT AND PLAN:     1. Preop examination  2. Intractable episodic cluster headache  3. Mucocele of ethmoid sinus    The patient is scheduled to have the above stated surgery on 2/28/2025.  I did tell the patient to wear mask when she is out of the home to help prevent any respiratory infection.  If she develops any fever, cough, respiratory symptoms, she needs to contact her surgeon.  I did tell her any respiratory infection would mean her surgery would need to be rescheduled.    4. Hyperlipidemia LDL goal <160    Cholesterol:     Lab Results   Component Value Date    CHOLEST 142 03/18/2024    CHOLEST 120 09/29/2018    CHOLEST 140 11/03/2016     Lab Results   Component Value Date    HDL 42 03/18/2024    HDL 44 (L) 09/29/2018    HDL 42 11/03/2016     Lab Results   Component Value Date    TRIG 43 03/18/2024    TRIG 24 09/29/2018    TRIG 33 11/03/2016    TRIGLY 67 01/30/2016    TRIGLY 68 10/15/2013    TRIGLY 48 09/29/2012     Lab Results   Component Value Date    LDL 90 03/18/2024    LDL 71 09/29/2018    LDL 91.4 11/03/2016     Lab Results   Component Value Date    AST 28 03/18/2024    AST 18 11/03/2016    AST 22 01/30/2016     Lab Results   Component Value Date    ALT 21 03/18/2024    ALT 30 09/29/2018    ALT 28 11/03/2016     Lipids are at goal.  I refilled the patient's atorvastatin.  She will be due for recheck on  her lipid panel with her annual Medicare wellness exam next month.    - atorvastatin 20 MG Oral Tab; Take 1 tablet (20 mg total) by mouth nightly.  Dispense: 90 tablet; Refill: 3    5. Seasonal allergic rhinitis, unspecified trigger    The patient should continue with her Flonase and Allegra as needed.  I refilled her Singulair today.    - montelukast (SINGULAIR) 10 MG Oral Tab; Take 1 tablet (10 mg total) by mouth daily.  Dispense: 90 tablet; Refill: 3    6. Asperger's disorder (HCC)  7. MDD (major depressive disorder), recurrent episode, mild  8. Anxiety  9. Obsessive-compulsive disorder, unspecified type    The patient is under the care of of her psychiatrist and should continue her medications as prescribed.    10. Morbid obesity (HCC)    I encouraged the patient to continue to monitor her diet and increase her activity.    11. Refused influenza vaccine    The patient declined her flu shot and COVID booster today.    - INFLUENZA REFUSED EE[facility]    Other orders  - LORazepam 0.5 MG Oral Tab; Take 1 tablet (0.5 mg total) by mouth daily as needed.      If not preop tests are required by the surgeon, the patient is cleared for her surgery.    This dictation was performed with a verbal recognition program (DRAGON) and it was checked for errors. It is possible that there are still dictated errors within this office note. If so, please bring any errors to my attention for an addendum. All efforts were made to ensure that this office note is accurate      2/16/25    BMP and EKG are normal. The patient is cleared for surgery.           [1] No Known Allergies

## 2025-02-12 ENCOUNTER — TELEPHONE (OUTPATIENT)
Dept: FAMILY MEDICINE CLINIC | Facility: CLINIC | Age: 35
End: 2025-02-12

## 2025-02-12 NOTE — TELEPHONE ENCOUNTER
Dr. Horner office returning call to states patient needs clearance on CBC, AND EKG. Will be faxing over pre-op orders.

## 2025-02-13 ENCOUNTER — TELEPHONE (OUTPATIENT)
Dept: FAMILY MEDICINE CLINIC | Facility: CLINIC | Age: 35
End: 2025-02-13

## 2025-02-13 DIAGNOSIS — Z01.818 PREOP EXAMINATION: Primary | ICD-10-CM

## 2025-02-13 NOTE — TELEPHONE ENCOUNTER
Received a fax from Dr Horner's office requesting patient have a BMP and EKG completed for pre op testing. Orders placed. And patient notified. Patient voiced understanding and agreeable. Information on locations to complete EKG sent to [facility] as well.

## 2025-02-15 ENCOUNTER — LAB ENCOUNTER (OUTPATIENT)
Dept: LAB | Age: 35
End: 2025-02-15
Attending: FAMILY MEDICINE
Payer: MEDICARE

## 2025-02-15 ENCOUNTER — EKG ENCOUNTER (OUTPATIENT)
Dept: LAB | Age: 35
End: 2025-02-15
Attending: FAMILY MEDICINE

## 2025-02-15 DIAGNOSIS — Z01.818 PREOP EXAMINATION: ICD-10-CM

## 2025-02-15 LAB
ANION GAP SERPL CALC-SCNC: 6 MMOL/L (ref 0–18)
ATRIAL RATE: 68 BPM
BUN BLD-MCNC: 9 MG/DL (ref 9–23)
CALCIUM BLD-MCNC: 9.6 MG/DL (ref 8.7–10.6)
CHLORIDE SERPL-SCNC: 107 MMOL/L (ref 98–112)
CO2 SERPL-SCNC: 29 MMOL/L (ref 21–32)
CREAT BLD-MCNC: 0.74 MG/DL
EGFRCR SERPLBLD CKD-EPI 2021: 108 ML/MIN/1.73M2 (ref 60–?)
FASTING STATUS PATIENT QL REPORTED: NO
GLUCOSE BLD-MCNC: 86 MG/DL (ref 70–99)
OSMOLALITY SERPL CALC.SUM OF ELEC: 292 MOSM/KG (ref 275–295)
P AXIS: 25 DEGREES
P-R INTERVAL: 174 MS
POTASSIUM SERPL-SCNC: 4 MMOL/L (ref 3.5–5.1)
Q-T INTERVAL: 374 MS
QRS DURATION: 94 MS
QTC CALCULATION (BEZET): 397 MS
R AXIS: 38 DEGREES
SODIUM SERPL-SCNC: 142 MMOL/L (ref 136–145)
T AXIS: 22 DEGREES
VENTRICULAR RATE: 68 BPM

## 2025-02-15 PROCEDURE — 80048 BASIC METABOLIC PNL TOTAL CA: CPT

## 2025-02-15 PROCEDURE — 93005 ELECTROCARDIOGRAM TRACING: CPT

## 2025-02-15 PROCEDURE — 36415 COLL VENOUS BLD VENIPUNCTURE: CPT

## 2025-02-15 PROCEDURE — 93010 ELECTROCARDIOGRAM REPORT: CPT | Performed by: INTERNAL MEDICINE

## 2025-02-16 ENCOUNTER — TELEPHONE (OUTPATIENT)
Dept: FAMILY MEDICINE CLINIC | Facility: CLINIC | Age: 35
End: 2025-02-16

## 2025-02-16 NOTE — TELEPHONE ENCOUNTER
Please send the updated preop exam from 2/11/25 to the surgeon. I have reviewed her preop lab and EKG which are normal and addended my note. She is clear for surgery.

## 2025-02-28 ENCOUNTER — HOSPITAL ENCOUNTER (OUTPATIENT)
Facility: HOSPITAL | Age: 35
Setting detail: HOSPITAL OUTPATIENT SURGERY
Discharge: HOME | End: 2025-02-28
Attending: OTOLARYNGOLOGY | Admitting: OTOLARYNGOLOGY
Payer: MEDICARE

## 2025-02-28 ENCOUNTER — ANESTHESIA (OUTPATIENT)
Dept: SURGERY | Facility: HOSPITAL | Age: 35
End: 2025-02-28
Payer: MEDICARE

## 2025-02-28 ENCOUNTER — ANESTHESIA EVENT (OUTPATIENT)
Dept: SURGERY | Facility: HOSPITAL | Age: 35
End: 2025-02-28
Payer: MEDICARE

## 2025-02-28 VITALS
WEIGHT: 277 LBS | DIASTOLIC BLOOD PRESSURE: 69 MMHG | OXYGEN SATURATION: 96 % | HEIGHT: 66 IN | HEART RATE: 82 BPM | RESPIRATION RATE: 20 BRPM | SYSTOLIC BLOOD PRESSURE: 132 MMHG | BODY MASS INDEX: 44.52 KG/M2 | TEMPERATURE: 98 F

## 2025-02-28 LAB — B-HCG UR QL: NEGATIVE

## 2025-02-28 PROCEDURE — 81025 URINE PREGNANCY TEST: CPT

## 2025-02-28 PROCEDURE — 09BL7ZZ EXCISION OF NASAL TURBINATE, VIA NATURAL OR ARTIFICIAL OPENING: ICD-10-PCS | Performed by: OTOLARYNGOLOGY

## 2025-02-28 RX ORDER — DIPHENHYDRAMINE HYDROCHLORIDE 50 MG/ML
12.5 INJECTION INTRAMUSCULAR; INTRAVENOUS AS NEEDED
Status: DISCONTINUED | OUTPATIENT
Start: 2025-02-28 | End: 2025-02-28

## 2025-02-28 RX ORDER — ONDANSETRON 2 MG/ML
INJECTION INTRAMUSCULAR; INTRAVENOUS AS NEEDED
Status: DISCONTINUED | OUTPATIENT
Start: 2025-02-28 | End: 2025-02-28 | Stop reason: SURG

## 2025-02-28 RX ORDER — OXYMETAZOLINE HYDROCHLORIDE 0.05 G/100ML
SPRAY NASAL AS NEEDED
Status: DISCONTINUED | OUTPATIENT
Start: 2025-02-28 | End: 2025-02-28 | Stop reason: HOSPADM

## 2025-02-28 RX ORDER — SODIUM CHLORIDE, SODIUM LACTATE, POTASSIUM CHLORIDE, CALCIUM CHLORIDE 600; 310; 30; 20 MG/100ML; MG/100ML; MG/100ML; MG/100ML
INJECTION, SOLUTION INTRAVENOUS CONTINUOUS
Status: DISCONTINUED | OUTPATIENT
Start: 2025-02-28 | End: 2025-02-28

## 2025-02-28 RX ORDER — LIDOCAINE HYDROCHLORIDE AND EPINEPHRINE 10; 10 MG/ML; UG/ML
INJECTION, SOLUTION INFILTRATION; PERINEURAL AS NEEDED
Status: DISCONTINUED | OUTPATIENT
Start: 2025-02-28 | End: 2025-02-28 | Stop reason: HOSPADM

## 2025-02-28 RX ORDER — MIDAZOLAM HYDROCHLORIDE 1 MG/ML
INJECTION INTRAMUSCULAR; INTRAVENOUS AS NEEDED
Status: DISCONTINUED | OUTPATIENT
Start: 2025-02-28 | End: 2025-02-28 | Stop reason: SURG

## 2025-02-28 RX ORDER — HYDROCODONE BITARTRATE AND ACETAMINOPHEN 5; 325 MG/1; MG/1
1 TABLET ORAL ONCE AS NEEDED
Status: DISCONTINUED | OUTPATIENT
Start: 2025-02-28 | End: 2025-02-28

## 2025-02-28 RX ORDER — LIDOCAINE HYDROCHLORIDE 10 MG/ML
INJECTION, SOLUTION EPIDURAL; INFILTRATION; INTRACAUDAL; PERINEURAL AS NEEDED
Status: DISCONTINUED | OUTPATIENT
Start: 2025-02-28 | End: 2025-02-28 | Stop reason: SURG

## 2025-02-28 RX ORDER — HYDROMORPHONE HYDROCHLORIDE 1 MG/ML
INJECTION, SOLUTION INTRAMUSCULAR; INTRAVENOUS; SUBCUTANEOUS
Status: COMPLETED
Start: 2025-02-28 | End: 2025-02-28

## 2025-02-28 RX ORDER — ONDANSETRON 2 MG/ML
4 INJECTION INTRAMUSCULAR; INTRAVENOUS EVERY 6 HOURS PRN
Status: DISCONTINUED | OUTPATIENT
Start: 2025-02-28 | End: 2025-02-28

## 2025-02-28 RX ORDER — HYDROMORPHONE HYDROCHLORIDE 1 MG/ML
0.2 INJECTION, SOLUTION INTRAMUSCULAR; INTRAVENOUS; SUBCUTANEOUS EVERY 5 MIN PRN
Status: DISCONTINUED | OUTPATIENT
Start: 2025-02-28 | End: 2025-02-28

## 2025-02-28 RX ORDER — PROCHLORPERAZINE EDISYLATE 5 MG/ML
5 INJECTION INTRAMUSCULAR; INTRAVENOUS EVERY 8 HOURS PRN
Status: DISCONTINUED | OUTPATIENT
Start: 2025-02-28 | End: 2025-02-28

## 2025-02-28 RX ORDER — HYDROCODONE BITARTRATE AND ACETAMINOPHEN 5; 325 MG/1; MG/1
2 TABLET ORAL ONCE AS NEEDED
Status: DISCONTINUED | OUTPATIENT
Start: 2025-02-28 | End: 2025-02-28

## 2025-02-28 RX ORDER — HYDROMORPHONE HYDROCHLORIDE 1 MG/ML
0.4 INJECTION, SOLUTION INTRAMUSCULAR; INTRAVENOUS; SUBCUTANEOUS EVERY 5 MIN PRN
Status: DISCONTINUED | OUTPATIENT
Start: 2025-02-28 | End: 2025-02-28

## 2025-02-28 RX ORDER — NALOXONE HYDROCHLORIDE 0.4 MG/ML
0.08 INJECTION, SOLUTION INTRAMUSCULAR; INTRAVENOUS; SUBCUTANEOUS AS NEEDED
Status: DISCONTINUED | OUTPATIENT
Start: 2025-02-28 | End: 2025-02-28

## 2025-02-28 RX ORDER — SCOPOLAMINE 1 MG/3D
1 PATCH, EXTENDED RELEASE TRANSDERMAL ONCE
Status: DISCONTINUED | OUTPATIENT
Start: 2025-02-28 | End: 2025-02-28

## 2025-02-28 RX ORDER — ACETAMINOPHEN 500 MG
1000 TABLET ORAL ONCE
Status: DISCONTINUED | OUTPATIENT
Start: 2025-02-28 | End: 2025-02-28 | Stop reason: HOSPADM

## 2025-02-28 RX ORDER — HYDROMORPHONE HYDROCHLORIDE 1 MG/ML
0.6 INJECTION, SOLUTION INTRAMUSCULAR; INTRAVENOUS; SUBCUTANEOUS EVERY 5 MIN PRN
Status: DISCONTINUED | OUTPATIENT
Start: 2025-02-28 | End: 2025-02-28

## 2025-02-28 RX ORDER — DEXAMETHASONE SODIUM PHOSPHATE 4 MG/ML
VIAL (ML) INJECTION AS NEEDED
Status: DISCONTINUED | OUTPATIENT
Start: 2025-02-28 | End: 2025-02-28 | Stop reason: SURG

## 2025-02-28 RX ORDER — MEPERIDINE HYDROCHLORIDE 25 MG/ML
12.5 INJECTION INTRAMUSCULAR; INTRAVENOUS; SUBCUTANEOUS AS NEEDED
Status: DISCONTINUED | OUTPATIENT
Start: 2025-02-28 | End: 2025-02-28

## 2025-02-28 RX ORDER — ACETAMINOPHEN 500 MG
1000 TABLET ORAL ONCE AS NEEDED
Status: DISCONTINUED | OUTPATIENT
Start: 2025-02-28 | End: 2025-02-28

## 2025-02-28 RX ORDER — MIDAZOLAM HYDROCHLORIDE 1 MG/ML
1 INJECTION INTRAMUSCULAR; INTRAVENOUS EVERY 5 MIN PRN
Status: DISCONTINUED | OUTPATIENT
Start: 2025-02-28 | End: 2025-02-28

## 2025-02-28 RX ADMIN — LIDOCAINE HYDROCHLORIDE 50 MG: 10 INJECTION, SOLUTION EPIDURAL; INFILTRATION; INTRACAUDAL; PERINEURAL at 07:54:00

## 2025-02-28 RX ADMIN — MIDAZOLAM HYDROCHLORIDE 2 MG: 1 INJECTION INTRAMUSCULAR; INTRAVENOUS at 07:50:00

## 2025-02-28 RX ADMIN — SODIUM CHLORIDE, SODIUM LACTATE, POTASSIUM CHLORIDE, CALCIUM CHLORIDE: 600; 310; 30; 20 INJECTION, SOLUTION INTRAVENOUS at 08:29:00

## 2025-02-28 RX ADMIN — ONDANSETRON 4 MG: 2 INJECTION INTRAMUSCULAR; INTRAVENOUS at 08:12:00

## 2025-02-28 RX ADMIN — DEXAMETHASONE SODIUM PHOSPHATE 8 MG: 4 MG/ML VIAL (ML) INJECTION at 07:54:00

## 2025-02-28 NOTE — H&P
History and physical by Dr. Bonilla on 2/11 reviewed and up to date. No changes to H&P.    Plan is excision of right ethmoid mucocele    We did discuss that this may not cure her of her headaches

## 2025-02-28 NOTE — ANESTHESIA PREPROCEDURE EVALUATION
PRE-OP EVALUATION    Patient Name: Marcelina Barber    Admit Diagnosis: INTRACTABLE EPISODIC CLUSTER HEADACHE; MUCOCELE OF ETHMOID SINUS    Pre-op Diagnosis: INTRACTABLE EPISODIC CLUSTER HEADACHE; MUCOCELE OF ETHMOID SINUS    RIGHT ETHMOID MUCOCELE EXCISION    Anesthesia Procedure: RIGHT ETHMOID MUCOCELE EXCISION (Right)    Surgeons and Role:     * Milton Horner MD - Primary    Pre-op vitals reviewed.        Body mass index is 44.06 kg/m².    Current medications reviewed.  Hospital Medications:  • acetaminophen (Tylenol Extra Strength) tab 1,000 mg  1,000 mg Oral Once   • scopolamine (Transderm-Scop) 1 MG/3DAYS patch 1 patch  1 patch Transdermal Once   • lactated ringers infusion   Intravenous Continuous       Outpatient Medications:   Prescriptions Prior to Admission[1]    Allergies: Patient has no known allergies.      Anesthesia Evaluation    Patient summary reviewed.    Anesthetic Complications  (-) history of anesthetic complications         GI/Hepatic/Renal    Negative GI/hepatic/renal ROS.                             Cardiovascular      ECG reviewed.  Exercise tolerance: good     MET: >4    (+) obesity     (+) hyperlipidemia                                  Endo/Other    Negative endo/other ROS.                              Pulmonary    Negative pulmonary ROS.                       Neuro/Psych      (+) depression  (+) anxiety                          Past Surgical History:   Procedure Laterality Date   • Other surgical history      s/p ear tubes   • Tonsillectomy  1992    w/ adenoidectomy     Social History     Socioeconomic History   • Marital status: Single   Tobacco Use   • Smoking status: Never   • Smokeless tobacco: Never   Vaping Use   • Vaping status: Never Used   Substance and Sexual Activity   • Alcohol use: No   • Drug use: No   • Sexual activity: Never   Other Topics Concern   • Caffeine Concern No   • Exercise Yes   • Seat Belt Yes   • Special Diet No   • Stress Concern No   • Weight  Concern Yes   •  Service No   • Blood Transfusions No   • Occupational Exposure No   • Hobby Hazards No   • Sleep Concern No   • Back Care No   • Bike Helmet No   • Self-Exams No     History   Drug Use No     Available pre-op labs reviewed.     Lab Results   Component Value Date     02/15/2025    K 4.0 02/15/2025     02/15/2025    CO2 29.0 02/15/2025    BUN 9 02/15/2025    CREATSERUM 0.74 02/15/2025    GLU 86 02/15/2025    CA 9.6 02/15/2025            Airway      Mallampati: II  Mouth opening: >3 FB  TM distance: > 6 cm  Neck ROM: full Cardiovascular    Cardiovascular exam normal.  Rhythm: regular  Rate: normal     Dental             Pulmonary    Pulmonary exam normal.  Breath sounds clear to auscultation bilaterally.               Other findings        ASA: 2   Plan: general  NPO status verified and patient meets guidelines.    Post-procedure pain management plan discussed with surgeon and patient.      Plan/risks discussed with: patient            Present on Admission:  **None**             [1]   Medications Prior to Admission   Medication Sig Dispense Refill Last Dose/Taking   • montelukast (SINGULAIR) 10 MG Oral Tab Take 1 tablet (10 mg total) by mouth daily. 90 tablet 3 Taking   • atorvastatin 20 MG Oral Tab Take 1 tablet (20 mg total) by mouth nightly. 90 tablet 3 Taking   • LORazepam 0.5 MG Oral Tab Take 1 tablet (0.5 mg total) by mouth daily as needed.   Taking As Needed   • fluticasone propionate 50 MCG/ACT Nasal Suspension 2 sprays by Each Nare route daily.   Taking   • fexofenadine 180 MG Oral Tab Take 1 tablet (180 mg total) by mouth daily.   Taking   • busPIRone HCl 30 MG Oral Tab Take 1 tablet (30 mg total) by mouth 2 (two) times daily. 60 tablet 0 Taking   • sertraline 100 MG Oral Tab 2 po qd (Patient taking differently: Take 3 tablets (300 mg total) by mouth daily. 2 po qd) 60 tablet 0 Taking Differently

## 2025-02-28 NOTE — OPERATIVE REPORT
[facility]  Operative Report    Marcelina Barber Location: OR   [facility] [id number] MRN [id number]   Admission Date 2/28/2025 Operation Date 2/28/2025   Attending Physician Milton Horner MD Operating Physician Milton Horner MD     Pre-Operative Diagnosis: INTRACTABLE EPISODIC CLUSTER HEADACHE; MUCOCELE OF ETHMOID SINUS    Post-Operative Diagnosis: Same as above    Procedure Performed:   Right middle turbinate mucocele excision    Anesthesia: General ET    EBL: 5    Specimen: none    Complications: None apparent    Findings: mucocele of the right middle turbinate    INDICATIONS:  The patient is a 34 year old female with a history of nasal obstruction and headaches. Despite medical therapy, her symptoms have persisted and it was determined she may benefit from the above procedure. The risks, benefits, and alternatives of the procedure were discussed with the patient, including the risk of bleeding, infection, anesthesia, persistent or recurrent obstruction, septal perforation, and need for additional procedures. Informed consent was obtained.    DESCRIPTION OF PROCEDURE: The patient was properly identified in the preoperative area and taken back to the operating room. Anesthesia was administered via endotracheal intubation. The patient was then positioned appropriately and prepped and draped in the usual fashion. Pledgets with Afrin were placed in the nasal cavities bilaterally and lidocaine 1% with 1:100,000 epinephrine was injected into the subperichondrial plane of the septum bilaterally. The pledgets were removed.       The right nasal cavity was addressed primarily.  1% lidocaine with epinephrine was injected into the axilla of the middle turbinate.  This produced purulence from the mucocele of the middle turbinate.  A sickle knife was used to incise this mucocele and more purulence expressed.  A microdebrider was used to fully open and remove the mucocele.  A small piece of novapack was placed at the  site. The patient tolerated the procedure well and was transferred to the post-anesthesia care unit in stable condition.     Milton Horner MD  2/28/2025  8:29 AM

## 2025-02-28 NOTE — ANESTHESIA PROCEDURE NOTES
Airway  Date/Time: 2/28/2025 7:57 AM  Urgency: elective      General Information and Staff    Patient location during procedure: OR  Anesthesiologist: Eduardo Frazier MD  Performed: anesthesiologist   Performed by: Eduardo Frazier MD  Authorized by: Eduardo Frazier MD      Indications and Patient Condition  Indications for airway management: anesthesia  Sedation level: deep  Preoxygenated: yes  Patient position: sniffing  Mask difficulty assessment: 0 - not attempted    Final Airway Details  Final airway type: supraglottic airway      Successful airway: classic  Size 3       Number of attempts at approach: 1

## 2025-02-28 NOTE — DISCHARGE INSTRUCTIONS
Call [facility] at [phone] or if it is after hours ask to have the doctor on call paged if your child has:    POST-OPERATIVE INSTRUCTIONS (SINUS & SEPTUM SURGERY)  Following Endoscopic Sinus Surgery   The procedure generally lasts from two to three hours. You can expect to go home after the procedure unless other medical conditions complicate recovery. Typically very small packs (called “spacers”) are placed in your nose to keep your sinuses open until you return to the office. You may have some silicone splints if you had septal surgery. Large packs or yards of gauze are not used except in unusual circumstances. These items are removed at your next scheduled office visit, which is generally one week following the operation. Full recovery may take a couple of weeks; however, you should be able to resume light activities within 2-3 days if not sooner.   Things to Expect  1. Bleeding from your nose is normal for 3-4 days. It may be heaviest during the first 24-48 hours and it may soak through the gauze. After this, it should slow down. If you are having heavy bleeding, are spitting up blood or feel faint, please notify us as soon as possible or call 911 if there is an emergency.  2. You may experience temporary numbness of the cheeks, upper lip, and upper teeth. This is usually temporary and should return to normal given time.  3. You may have a headache after surgery. However, if you experience severe headache associated with stiff neck, nausea, vomiting, confusion, or malaise, or anything else that worries you, call us as soon as possible or call 911.  4. Lying down flat may cause pressure to increase in your face; therefore, you may want to sleep with your head elevated  5. Dry blood, mucus and crusting in the nose will occur, and may result in cold-like or sinusitis-like symptoms. This is normal for two to three weeks after surgery. It is important to begin nasal irrigations with saline (salt water)  solution starting the day of surgery.  6. You may experience a temporary loss of smell and taste; this should return when healing is complete.  Over the Counter Medications  1. You will need to purchase a rinse bottle at a local drug store. We recommend the NeilMed Sinus Rinse bottle (8oz, adult). You can purchase the NeilMed Sinus Rinse bottle at [employer], [employer], or other places that sell over the counter drugs. The NeilMed Sinus Rinse kit comes with salt packets that are mixed with distilled or boiled water.  2. Over the counter decongestant sprays such as Afrin (oxymetazoline) or Kirk-Synephrine may help slow down bleeding in the first few days, but they should NOT be used beyond day 3.   THINGS TO DO  Take pain medicines as prescribed. Do not take any additional pain medications. Always follow the instructions on the medicine bottle. If the pain medication is not helping, please call us as soon as possible.  Take the antibiotics, if prescribed, according to instructions.  Take oral steroids, if prescribed, according to instructions.  Start NeilMed Sinus Rinse irrigations on the day of surgery. You should try to wash 4-6 times per day. This will keep the packing clean and help you heal faster. See the additional handout regarding the [facility] Irrigations.  Sneeze or cough with your mouth open if needed  Eat a regular diet  Shower and wash as needed, but do not submerge your head in a tub or pool  Avoid air travel for 3 weeks following surgery  Take your pain medicines before your first post-operative visit  THINGS NOT TO DO  DO NOT perform any strenuous activity (gym, sports, weight lifting, sex) until your first post-operative visit and are cleared  DO NOT blow your nose or pick at your nose until cleared by your doctor  DO NOT attempt to remove any packing  DO NOT take aspirin or aspirin containing medicines, Advil, Motrin, Aleve, Excedrin, Bufferin or any other NSAIDS  DO NOT fly without your  doctor’s clearance (or until 3 weeks following surgery)  Call Immediately if any of the following occur:   1. Change in vision  2. Neck stiffness or deep head pain  3. Continued nausea or vomiting  4. Bright red blood that lasts more than ten minutes or causes choking  5. Fever over 101 degrees    With any concerns or questions, or ANY bleeding, call and ask for the ENT on call physician

## 2025-02-28 NOTE — ANESTHESIA POSTPROCEDURE EVALUATION
[facility]    Marcelina Barber Patient Status:  Hospital Outpatient Surgery   Age/Gender 34 year old female MRN [id number]   Location [facility] SURGERY Attending Milton Horner MD   Hosp Day # 0 PCP Zonia Bonilla DO       Anesthesia Post-op Note    RIGHT ETHMOID MUCOCELE EXCISION    Procedure Summary       Date: 02/28/25 Room / Location:  MAIN OR 03 /  MAIN OR    Anesthesia Start: 0750 Anesthesia Stop: 0829    Procedure: RIGHT ETHMOID MUCOCELE EXCISION (Right) Diagnosis: (INTRACTABLE EPISODIC CLUSTER HEADACHE; MUCOCELE OF ETHMOID SINUS)    Surgeons: Milton Horner MD Anesthesiologist: Eduardo Frazier MD    Anesthesia Type: general ASA Status: 2            Anesthesia Type: general    Vitals Value Taken Time   /72 02/28/25 0832   Temp 97.4 02/28/25 0832   Pulse 92 02/28/25 0832   Resp 16 02/28/25 0832   SpO2 96 02/28/25 0832           Patient Location: PACU    Anesthesia Type: general    Airway Patency: extubated    Postop Pain Control: adequate    Mental Status: mildly sedated but able to meaningfully participate in the post-anesthesia evaluation    Nausea/Vomiting: none    Cardiopulmonary/Hydration status: stable euvolemic    Complications: no apparent anesthesia related complications    Postop vital signs: stable    Dental Exam: Unchanged from Preop    Patient to be discharged from PACU when criteria met.

## 2025-03-03 ENCOUNTER — PATIENT MESSAGE (OUTPATIENT)
Dept: FAMILY MEDICINE CLINIC | Facility: CLINIC | Age: 35
End: 2025-03-03

## 2025-03-03 ENCOUNTER — TELEPHONE (OUTPATIENT)
Dept: FAMILY MEDICINE CLINIC | Facility: CLINIC | Age: 35
End: 2025-03-03

## 2025-03-03 NOTE — TELEPHONE ENCOUNTER
Patient had surgery on Friday. Now patient is having stomach, side and lower back pain since 3/1. When she urinates has a foul smell, some cloudiness to urine. No blood in urine. Patient denies frequency or pressure. Patient has been feeling quite exhausted as well.   Advised patient to go to walk in clinic for further evaluation. Patient voiced understanding and agreeable.   
Spring is experiencing symptoms after her surgery on Friday. She has pain when urinating and their is a strong odor. She is very tired as well. She wants to talk to the nurse. She is a patient of Dr. Zonia Bonilla.   
Former smoker

## 2025-04-11 ENCOUNTER — TELEPHONE (OUTPATIENT)
Dept: FAMILY MEDICINE CLINIC | Facility: CLINIC | Age: 35
End: 2025-04-11

## 2025-04-11 NOTE — TELEPHONE ENCOUNTER
Spoke with patients mother, she did not want to set up MAW with Dr. Zonia Bonilla as patient is very anxious with any doctor and will not be scheduling appointment unless something major happens.    GRAHAM

## 2025-05-26 ENCOUNTER — HOSPITAL ENCOUNTER (OUTPATIENT)
Age: 35
Discharge: HOME | End: 2025-05-26
Payer: MEDICARE

## 2025-05-26 VITALS
DIASTOLIC BLOOD PRESSURE: 62 MMHG | RESPIRATION RATE: 19 BRPM | WEIGHT: 275 LBS | HEART RATE: 79 BPM | HEIGHT: 66 IN | SYSTOLIC BLOOD PRESSURE: 138 MMHG | OXYGEN SATURATION: 98 % | BODY MASS INDEX: 44.2 KG/M2 | TEMPERATURE: 97 F

## 2025-05-26 DIAGNOSIS — B37.2 CANDIDAL INTERTRIGO: Primary | ICD-10-CM

## 2025-05-26 PROCEDURE — 99214 OFFICE O/P EST MOD 30 MIN: CPT

## 2025-05-26 PROCEDURE — 99213 OFFICE O/P EST LOW 20 MIN: CPT

## 2025-05-26 RX ORDER — KETOCONAZOLE 20 MG/G
1 CREAM TOPICAL 2 TIMES DAILY
Qty: 30 G | Refills: 0 | Status: SHIPPED | OUTPATIENT
Start: 2025-05-26 | End: 2025-06-09

## 2025-05-26 NOTE — ED INITIAL ASSESSMENT (HPI)
Patient reports a rash underneath her right breast for about a week.  Patient reports it started out as a blemish but has gotten progressively worse.

## 2025-05-26 NOTE — ED PROVIDER NOTES
Patient Seen in: [facility]        History  Chief Complaint   Patient presents with    Rash Skin Problem     Stated Complaint: Rash    Subjective:   34-year-old female here for evaluation of a rash underneath the right breast.  Patient is not breast-feeding.  No fevers.                      Objective:     Past Medical History:    Allergic rhinitis    ANXIETY    sees Dr Berta Hagan in [place]    Anxiety    Autism spectrum disorder (HCC)    DEPRESSION    sees Dr Berta Hagan in [place]    Depression    High cholesterol    Hyperlipemia    HYPERLIPIDEMIA    Hyperlipidemia    Morbid obesity (HCC)    Obesity    OCD (obsessive compulsive disorder)    Visual impairment    glasses              Past Surgical History:   Procedure Laterality Date    Other surgical history      s/p ear tubes    Tonsillectomy  1992    w/ adenoidectomy                Social History     Socioeconomic History    Marital status: Single   Tobacco Use    Smoking status: Never    Smokeless tobacco: Never   Vaping Use    Vaping status: Never Used   Substance and Sexual Activity    Alcohol use: No    Drug use: No    Sexual activity: Never   Other Topics Concern    Caffeine Concern No    Exercise Yes    Seat Belt Yes    Special Diet No    Stress Concern No    Weight Concern Yes     Service No    Blood Transfusions No    Occupational Exposure No    Hobby Hazards No    Sleep Concern No    Back Care No    Bike Helmet No    Self-Exams No   Social History Narrative    occup: [employer], marital status: single, children: no    tobacco: never, etoh: no, illicits: no    sexually active: never, contraception: n/a     Social Drivers of Health      Received from [facility]              Review of Systems   Constitutional: Negative.    Skin:  Positive for rash.   All other systems reviewed and are negative.      Positive for stated complaint: Rash  Other systems are as noted in  HPI.  Constitutional and vital signs reviewed.      All other systems reviewed and negative except as noted above.                  Physical Exam    ED Triage Vitals [05/26/25 0808]   /62   Pulse 79   Resp 19   Temp 97.4 °F (36.3 °C)   Temp src Oral   SpO2 98 %   O2 Device None (Room air)       Current Vitals:   Vital Signs  BP: 138/62  Pulse: 79  Resp: 19  Temp: 97.4 °F (36.3 °C)  Temp src: Oral    Oxygen Therapy  SpO2: 98 %  O2 Device: None (Room air)            Physical Exam  Vitals and nursing note reviewed. Chaperone present: trenton gloria.   Constitutional:       General: She is not in acute distress.     Appearance: Normal appearance. She is not ill-appearing, toxic-appearing or diaphoretic.   Skin:     General: Skin is warm and dry.      Coloration: Skin is not jaundiced or pale.      Comments: Erythematous flat patch of rash underneath the right breast.   Neurological:      Mental Status: She is alert and oriented to person, place, and time.   Psychiatric:         Behavior: Behavior normal.                 ED Course  Labs Reviewed - No data to display                         MDM             Medical Decision Making  Nontoxic adult female patient with findings consistent with candidal infection of the intertriginous area of the right breast.  Differential diagnosis also considered but not likely include cellulitis, shingles    Supportive/home management of diagnosis/illness/injury discussed. Red flag symptoms discussed.  Signs and symptoms/criteria that would necessitate reevaluation, including ER evaluation discussed.  Patient and/or responsible adult verbalize and agree with management and plan of care.    Speech recognition software was used during this dictation.  There may be minor errors in transcription.          Disposition and Plan     Clinical Impression:  1. Candidal intertrigo         Disposition:  Discharge  5/26/2025  8:26 am    Follow-up:  Zonia Bonilla DO  [address]  [place]  [place] [address]  [phone]    Schedule an appointment as soon as possible for a visit       The emergency department    Go to   If symptoms worsen          Medications Prescribed:  Current Discharge Medication List        START taking these medications    Details   ketoconazole 2 % External Cream Apply 1 Application topically 2 (two) times daily for 14 days.  Qty: 30 g, Refills: 0                   Supplementary Documentation:

## 2025-05-29 ENCOUNTER — OFFICE VISIT (OUTPATIENT)
Dept: NEUROLOGY | Facility: CLINIC | Age: 35
End: 2025-05-29
Payer: MEDICARE

## 2025-05-29 VITALS
RESPIRATION RATE: 16 BRPM | HEART RATE: 75 BPM | SYSTOLIC BLOOD PRESSURE: 110 MMHG | BODY MASS INDEX: 44 KG/M2 | WEIGHT: 275 LBS | DIASTOLIC BLOOD PRESSURE: 70 MMHG

## 2025-05-29 DIAGNOSIS — G44.52 NPDH (NEW PERSISTENT DAILY HEADACHE): Primary | ICD-10-CM

## 2025-05-29 PROCEDURE — 99204 OFFICE O/P NEW MOD 45 MIN: CPT | Performed by: OTHER

## 2025-05-29 RX ORDER — TOPIRAMATE 25 MG/1
TABLET, FILM COATED ORAL
Qty: 120 TABLET | Refills: 2 | Status: SHIPPED | OUTPATIENT
Start: 2025-05-29 | End: 2025-06-02

## 2025-05-29 NOTE — PATIENT INSTRUCTIONS
Refill policies:    Allow 2-3 business days for refills; controlled substances may take longer.  Contact your pharmacy at least 5 days prior to running out of medication and have them send an electronic request or submit request through the “request refill” option in your [employer] account.  Refills are not addressed on weekends; covering physicians do not authorize routine medications on weekends.  No narcotics or controlled substances are refilled after noon on Fridays or by on call physicians.  By law, narcotics must be electronically prescribed.  A 30 day supply with no refills is the maximum allowed.  If your prescription is due for a refill, you may be due for a follow up appointment.  To best provide you care, patients receiving routine medications need to be seen at least once a year.  Patients receiving narcotic/controlled substance medications need to be seen at least once every 3 months.  In the event that your preferred pharmacy does not have the requested medication in stock (e.g. Backordered), it is your responsibility to find another pharmacy that has the requested medication available.  We will gladly send a new prescription to that pharmacy at your request.    Scheduling Tests:    If your physician has ordered radiology tests such as MRI or CT scans, please contact Central Scheduling at [phone] right away to schedule the test.  Once scheduled, the [facility] Centralized Referral Team will work with your insurance carrier to obtain pre-certification or prior authorization.  Depending on your insurance carrier, approval may take 3-10 days.  It is highly recommended patients assure they have received an authorization before having a test performed.  If test is done without insurance authorization, patient may be responsible for the entire amount billed.      Precertification and Prior Authorizations:  If your physician has recommended that you have a procedure or additional testing performed the [facility]  Centralized Referral Team will contact your insurance carrier to obtain pre-certification or prior authorization.    You are strongly encouraged to contact your insurance carrier to verify that your procedure/test has been approved and is a COVERED benefit.  Although the [facility] Centralized Referral Team does its due diligence, the insurance carrier gives the disclaimer that \"Although the procedure is authorized, this does not guarantee payment.\"    Ultimately the patient is responsible for payment.   Thank you for your understanding in this matter.  Paperwork Completion:  If you require FMLA or disability paperwork for your recovery, please make sure to either drop it off or have it faxed to our office at [phone]. Be sure the form has your name and date of birth on it.  The form will be faxed to our Forms Department and they will complete it for you.  There is a 25$ fee for all forms that need to be filled out.  Please be aware there is a 10-14 day turnaround time.  You will need to sign a release of information (FAWAD) form if your paperwork does not come with one.  You may call the Forms Department with any questions at [phone].  Their fax number is [phone].

## 2025-05-29 NOTE — PROGRESS NOTES
[facility]Y CONSULTATION    Date of consult: 5/29/2025    The following individual(s) verbally consented to be recorded using ambient AI listening technology and understand that they can each withdraw their consent to this listening technology at any point by asking the clinician to turn off or pause the recording:    Patient name: Marcelina Barber        ICD-10-CM    1. NPDH (new persistent daily headache)  G44.52 MRI BRAIN (CPT=70551)        Assessment & Plan  New persistent daily headache  daily headaches exacerbated by weather changes. Previous specialist evaluations inconclusive. No family history of migraines. Over-the-counter analgesics ineffective, may cause rebound headaches. Noise sensitivity noted. Differential includes migraine, rule out intracranial pathology, medication overuse headache, sleep disorder or sinus related headache. MRI recommended to rule out structural causes.   - Order MRI of the brain to rule out structural causes.  - Prescribe topiramate for migraine prevention, starting with a low dose and gradually increasing over two weeks. Monitor for paresthesia, monitor side effects. Encourage hydration.  - Advise discontinuation of over-the-counter analgesics to prevent medication overuse headache.  - Follow up in two months to discuss medication efficacy and review MRI results.  - Monitor for symptoms of sleep apnea      Procedures    MRI BRAIN (CPT=70551)   See orders and medications filed with this encounter. The patient indicates understanding of these issues and agrees with the plan.  Discussed with patient regarding assessment, work up, care plan and possible adverse and side effects of the medications.  Pt should go ER for any new or worsening symptoms and contact office    Subjective:   CC/Reason for consult: daily headache   Consult Requested by  Zonia Bonilla DO  History of Present Illness  Spring Barber is a 34 year old female who presents with new  persistent daily headaches.    She experiences daily headaches that have persisted for several years, sensitive to weather changes, particularly during winter, spring, and rainy conditions, lasting throughout the day.    She has tried various over-the-counter medications including Aleve, Excedrin, Tylenol, and Advil without relief. She has not tried sumatriptan or Imitrex.    She has consulted with an eye doctor, dentist, sinus expert, and allergist, but the headaches persist. She has not undergone any brain imaging such as a CT or MRI.    She is unsure about any family history of migraine headaches. She does not smoke or drink alcohol regularly. She works as a  and reports that noise bothers her, but not bright lights or screens. She exercises by walking her dogs regularly for about ten minutes a day.      History/Other:   REVIEW OF SYSTEMS:  A comprehensive 14-point system was reviewed. Pertinent positives and negatives are noted in HPI.     Medications - Current[1]  Allergies:  Allergies[2]  Past Medical History[3]  Past Surgical History[4]  Social History:  Social History     Tobacco Use    Smoking status: Never    Smokeless tobacco: Never   Substance Use Topics    Alcohol use: No     Family History[5]  Patient denies any pertinent family history associated with the current problem    Objective:   Physical Examination:  /70   Pulse 75   Resp 16   Wt 275 lb (124.7 kg)   LMP 05/15/2025 (Approximate)   BMI 44.39 kg/m²   General: Awake and alert; in no acute distress  HEENT: Eye sclerae are anicteric; scalp is atraumatic  Neck: Supple  Cardiac: Regular rate   Lungs: Clear   Abdomen:  non-tender  Extremities: No clubbing or cyanosis; moves extremities   Psychiatric: Normal mood and affect; answers questions appropriately  Dermatologic: No rashes  Neurological Examination:  Language: normal   Speech: no dysarthria  CN: II-XII intact  Motor strength: 5/5 all extremities  Tone: normal  DTRs: 2+  symmetric  Coordination: Normal FTN  Sensory: symmetric   Gait: nl    Data Reviewed on 5/29/2025  Notes Reviewed on 5/29/2025  Labs Reviewed  on 5/29/2025    Hamzah Rivers MD   Neurology  [facility]  5/29/2025, 10:13 AM  Consultation Report: being sent/fax/route to requesting provider   CC: Zonia Bonilla DO       [1]   Current Outpatient Medications:     topiramate 25 MG Oral Tab, Take 1 tab bid po x 1st week, then 2 tabs bid po thereafter, Disp: 120 tablet, Rfl: 2    ketoconazole 2 % External Cream, Apply 1 Application topically 2 (two) times daily for 14 days., Disp: 30 g, Rfl: 0    montelukast (SINGULAIR) 10 MG Oral Tab, Take 1 tablet (10 mg total) by mouth daily., Disp: 90 tablet, Rfl: 3    atorvastatin 20 MG Oral Tab, Take 1 tablet (20 mg total) by mouth nightly., Disp: 90 tablet, Rfl: 3    LORazepam 0.5 MG Oral Tab, Take 1 tablet (0.5 mg total) by mouth daily as needed., Disp: , Rfl:     fluticasone propionate 50 MCG/ACT Nasal Suspension, 2 sprays by Each Nare route in the morning., Disp: , Rfl:     fexofenadine 180 MG Oral Tab, Take 1 tablet (180 mg total) by mouth in the morning., Disp: , Rfl:     busPIRone HCl 30 MG Oral Tab, Take 1 tablet (30 mg total) by mouth 2 (two) times daily., Disp: 60 tablet, Rfl: 0    sertraline 100 MG Oral Tab, 2 po qd, Disp: 60 tablet, Rfl: 0  [2] No Known Allergies  [3]   Past Medical History:   Allergic rhinitis    ANXIETY    sees Dr Berta Hagan in [place]    Anxiety    Autism spectrum disorder (HCC)    DEPRESSION    sees Dr Berta Hagan in [place]    Depression    High cholesterol    Hyperlipemia    HYPERLIPIDEMIA    Hyperlipidemia    Morbid obesity (HCC)    Obesity    OCD (obsessive compulsive disorder)    Visual impairment    glasses   [4]   Past Surgical History:  Procedure Laterality Date    Other surgical history      s/p ear tubes    Tonsillectomy  1992    w/ adenoidectomy   [5]   Family History  Problem Relation Age of Onset    Cancer  Father         lung ca,  at 54    Obesity Father     Psychiatric Father         bipolar    Obesity Mother     Other (asthma) Mother     Asthma Mother     Hypertension Mother     Asthma Brother     Obesity Brother     Dementia Maternal Grandfather     Dementia Maternal Grandmother     Diabetes Neg     Heart Disorder Neg

## 2025-05-29 NOTE — PROGRESS NOTES
Pt states headaches daily for about 2 years. States no OTC meds helps     .     The following individual(s) verbally consented to be recorded using ambient AI listening technology and understand that they can each withdraw their consent to this listening technology at any point by asking the clinician to turn off or pause the recording:    Patient name: Marcelina NEGRITO Barber

## 2025-06-09 ENCOUNTER — HOSPITAL ENCOUNTER (OUTPATIENT)
Age: 35
Discharge: HOME | End: 2025-06-09
Payer: MEDICARE

## 2025-06-09 VITALS
DIASTOLIC BLOOD PRESSURE: 55 MMHG | TEMPERATURE: 98 F | RESPIRATION RATE: 18 BRPM | SYSTOLIC BLOOD PRESSURE: 122 MMHG | HEART RATE: 67 BPM | WEIGHT: 275 LBS | OXYGEN SATURATION: 98 % | BODY MASS INDEX: 43.16 KG/M2 | HEIGHT: 67 IN

## 2025-06-09 DIAGNOSIS — B35.4 TINEA CORPORIS: ICD-10-CM

## 2025-06-09 DIAGNOSIS — L25.9 CONTACT DERMATITIS, UNSPECIFIED CONTACT DERMATITIS TYPE, UNSPECIFIED TRIGGER: Primary | ICD-10-CM

## 2025-06-09 PROCEDURE — 99213 OFFICE O/P EST LOW 20 MIN: CPT

## 2025-06-09 RX ORDER — HYDROXYZINE HYDROCHLORIDE 25 MG/1
TABLET, FILM COATED ORAL EVERY 4 HOURS PRN
Qty: 20 TABLET | Refills: 0 | Status: SHIPPED | OUTPATIENT
Start: 2025-06-09 | End: 2025-07-09

## 2025-06-09 NOTE — DISCHARGE INSTRUCTIONS
VISIT SUMMARY:  Today, you were seen for a rash under your breasts that has been present for a couple of weeks. You suspect it may be an allergic reaction to a new pomegranate body wash. You have been using a cream that initially helped, but the rash reoccurred a couple of days ago. You stopped using the body wash last night and continue to use the cream.    YOUR PLAN:  -CONTACT DERMATITIS: Contact dermatitis is a skin reaction that occurs after exposure to an allergen or irritant. In your case, it is likely due to the pomegranate body wash or possibly fabric or detergents. You should discontinue using the pomegranate body wash and keep the affected areas clean and dry. I have prescribed Atarax to help with the itching. It is also recommended to wear cotton bras to absorb moisture and consider using allergy or dye-free detergent. Please monitor for any difficulty breathing or swallowing and seek medical attention if these symptoms occur.    INSTRUCTIONS:  Please discontinue using the pomegranate body wash and continue to use the prescribed cream. Take Atarax as directed to help with itching. Keep the affected areas clean and dry, wear cotton bras, and consider switching to an allergy or dye-free detergent. Monitor for any difficulty breathing or swallowing and seek medical attention if these symptoms occur.    Contains text generated by Kishore

## 2025-06-09 NOTE — ED PROVIDER NOTES
Patient Seen in: [facility]       The following individual(s) verbally consented to be recorded using ambient AI listening technology and understand that they can each withdraw their consent to this listening technology at any point by asking the clinician to turn off or pause the recording:    Patient name: Marcelina Barber        History  Chief Complaint   Patient presents with    Rash Skin Problem     Stated Complaint: rash    Subjective:   HPI     Spring Barber is a 34 year old female who presents with a rash under her breasts.    The rash developed a couple of weeks ago and is primarily located under her breasts, with some extension to nearby areas. It is itchy and she suspects it may be an allergic reaction to a new pomegranate body wash.    She was previously prescribed a cream for the rash, which initially provided relief, but the rash reoccurred a couple of days ago. She stopped using the body wash last night and continues to use the cream as of yesterday.    No difficulty breathing or swallowing. The rash is not present in other areas such as between her legs. She uses Tide pods for laundry and has not changed detergents or fabric softeners recently.        Objective:     Past Medical History:    Allergic rhinitis    ANXIETY    sees Dr Berta Hagan in [place]    Anxiety    Autism spectrum disorder (HCC)    DEPRESSION    sees Dr Berta Hagan in [place]    Depression    High cholesterol    Hyperlipemia    HYPERLIPIDEMIA    Hyperlipidemia    Morbid obesity (HCC)    Obesity    OCD (obsessive compulsive disorder)    Visual impairment    glasses              Past Surgical History:   Procedure Laterality Date    Other surgical history      s/p ear tubes    Tonsillectomy  1992    w/ adenoidectomy                Social History     Socioeconomic History    Marital status: Single   Tobacco Use    Smoking status: Never    Smokeless tobacco: Never   Vaping Use    Vaping status:  Never Used   Substance and Sexual Activity    Alcohol use: No    Drug use: No    Sexual activity: Never   Other Topics Concern    Caffeine Concern No    Exercise Yes    Seat Belt Yes    Special Diet No    Stress Concern No    Weight Concern Yes     Service No    Blood Transfusions No    Occupational Exposure No    Hobby Hazards No    Sleep Concern No    Back Care No    Bike Helmet No    Self-Exams No   Social History Narrative    occup: [employer], marital status: single, children: no    tobacco: never, etoh: no, illicits: no    sexually active: never, contraception: n/a     Social Drivers of Health      Received from [facility]              Review of Systems   Constitutional: Negative.    HENT:  Negative for trouble swallowing.    Eyes: Negative.    Respiratory:  Negative for shortness of breath.    Cardiovascular: Negative.    Gastrointestinal: Negative.    Endocrine: Negative.    Genitourinary: Negative.    Musculoskeletal: Negative.    Skin:  Positive for rash.   Allergic/Immunologic: Negative.    Neurological: Negative.    Hematological: Negative.    Psychiatric/Behavioral: Negative.         Positive for stated complaint: rash  Other systems are as noted in HPI.  Constitutional and vital signs reviewed.      All other systems reviewed and negative except as noted above.                  Physical Exam    ED Triage Vitals [06/09/25 1240]   /55   Pulse 67   Resp 18   Temp 97.8 °F (36.6 °C)   Temp src Oral   SpO2 98 %   O2 Device None (Room air)       Current Vitals:   Vital Signs  BP: 122/55  Pulse: 67  Resp: 18  Temp: 97.8 °F (36.6 °C)  Temp src: Oral    Oxygen Therapy  SpO2: 98 %  O2 Device: None (Room air)            Physical Exam  Vitals and nursing note reviewed.   Constitutional:       Appearance: Normal appearance.   HENT:      Head: Normocephalic and atraumatic.      Right Ear: External ear normal.      Left Ear: External ear normal.   Eyes:       Extraocular Movements: Extraocular movements intact.      Conjunctiva/sclera: Conjunctivae normal.      Pupils: Pupils are equal, round, and reactive to light.   Pulmonary:      Effort: Pulmonary effort is normal.   Musculoskeletal:      Cervical back: Normal range of motion and neck supple.   Skin:     Findings: Rash present.      Comments: Urticaria appreciated under bilateral breasts and bilateral axillary region.  Patient is wearing a nylon bra.   Neurological:      Mental Status: She is alert.   Psychiatric:         Mood and Affect: Mood normal.               ED Course  Labs Reviewed - No data to display                         MDM     Spring Barber is a 34 year old female who presents with a rash under her breasts.    The rash developed a couple of weeks ago and is primarily located under her breasts, with some extension to nearby areas. It is itchy and she suspects it may be an allergic reaction to a new pomegranate body wash.    She was previously prescribed a cream for the rash, which initially provided relief, but the rash reoccurred a couple of days ago. She stopped using the body wash last night and continues to use the cream as of yesterday.    No difficulty breathing or swallowing. The rash is not present in other areas such as between her legs. She uses Tide pods for laundry and has not changed detergents or fabric softeners recently.  VS: See EMR  Physical exam: See exam  Diff Diag: Contact dermatitis, urticaria, tinea coporis.   Based on physical exam and HPI will diagnose with contact dermatitis.  Patient is to continue using ketoconazole.  Will prescribe hydroxyzine as needed for itching.  Patient instructed to change her detergent from Tide pods to the Tide allergy pods.  ED precautions given.        Medical Decision Making  Spring Barber is a 34 year old female who presents with a rash under her breasts.    The rash developed a couple of weeks ago and is primarily located under  her breasts, with some extension to nearby areas. It is itchy and she suspects it may be an allergic reaction to a new pomegranate body wash.    She was previously prescribed a cream for the rash, which initially provided relief, but the rash reoccurred a couple of days ago. She stopped using the body wash last night and continues to use the cream as of yesterday.    No difficulty breathing or swallowing. The rash is not present in other areas such as between her legs. She uses Tide pods for laundry and has not changed detergents or fabric softeners recently.    Problems Addressed:  Contact dermatitis, unspecified contact dermatitis type, unspecified trigger: acute illness or injury  Tinea corporis: acute illness or injury    Amount and/or Complexity of Data Reviewed  External Data Reviewed: notes.    Risk  Prescription drug management.        Disposition and Plan     Clinical Impression:  1. Contact dermatitis, unspecified contact dermatitis type, unspecified trigger    2. Tinea corporis         Disposition:  Discharge  6/9/2025  1:15 pm    Follow-up:  Zonia Bonilla DO  [address] [address] [address] [phone]      As needed          Medications Prescribed:  Current Discharge Medication List        START taking these medications    Details   hydrOXYzine 25 MG Oral Tab Take 1-2 tablets (25-50 mg total) by mouth every 4 (four) hours as needed for Itching.  Qty: 20 tablet, Refills: 0                   Supplementary Documentation:

## 2025-07-09 ENCOUNTER — HOSPITAL ENCOUNTER (OUTPATIENT)
Dept: MRI IMAGING | Age: 35
Discharge: HOME | End: 2025-07-09
Attending: Other
Payer: MEDICARE

## 2025-07-09 DIAGNOSIS — G44.52 NPDH (NEW PERSISTENT DAILY HEADACHE): ICD-10-CM

## 2025-07-09 PROCEDURE — 70551 MRI BRAIN STEM W/O DYE: CPT | Performed by: OTHER

## 2025-08-30 ENCOUNTER — HOSPITAL ENCOUNTER (OUTPATIENT)
Age: 35
Discharge: HOME | End: 2025-08-30
Attending: EMERGENCY MEDICINE

## 2025-08-30 VITALS
HEIGHT: 67 IN | BODY MASS INDEX: 42.69 KG/M2 | SYSTOLIC BLOOD PRESSURE: 132 MMHG | OXYGEN SATURATION: 100 % | WEIGHT: 272 LBS | HEART RATE: 79 BPM | RESPIRATION RATE: 18 BRPM | DIASTOLIC BLOOD PRESSURE: 59 MMHG | TEMPERATURE: 98 F

## 2025-08-30 DIAGNOSIS — K59.00 CONSTIPATION, UNSPECIFIED CONSTIPATION TYPE: ICD-10-CM

## 2025-08-30 DIAGNOSIS — R10.9 ABDOMINAL PAIN OF UNKNOWN ETIOLOGY: Primary | ICD-10-CM

## (undated) DEVICE — SOLUTION IRRIG 1000ML 0.9% NACL USP BTL

## (undated) DEVICE — NASAL PACKING CS3600-10 NOVAPAK 10PK STD: Brand: NOVAPAK

## (undated) DEVICE — DALE NASAL DRESSING HOLDER, FITS MOST: Brand: DALE NASAL DRESSING HOLDER

## (undated) DEVICE — KIT,ANTI FOG,W/SPONGE & FLUID,SOFT PACK: Brand: MEDLINE

## (undated) DEVICE — SPLINT 1524050 5PK PAIR DOYLE II AIRWAY: Brand: DOYLE II ™

## (undated) DEVICE — SUT PROL 2-0 30IN CT-2 NABSRB BLU L26MM 1/2 C

## (undated) DEVICE — PACK CDS SINUS

## (undated) DEVICE — SUT PLN GUT 4-0 18IN SC-1 ABSRB TAN YELLOWISH

## (undated) DEVICE — SYRINGE MED 10ML LL CTRL W/ FNGR GRP CLR BRL

## (undated) DEVICE — SHEATH 1912008 5PK 4MM/0DEG WOLFE: Brand: ENDO-SCRUB®

## (undated) DEVICE — SUT CHRM GUT 4-0 27IN RB-1 ABSRB UD 17MM 1/2

## (undated) DEVICE — SLEEVE COMPR MD KNEE LEN SGL USE KENDALL SCD

## (undated) DEVICE — TRAP SPEC UNIV ULT FOR INTELLICART SYS

## (undated) NOTE — MR AVS SNAPSHOT
[facility]  [address] [address] [phone]               Thank you for choosing us for your health care visit with OLESYA Driver. We are glad to serve you and happy to provide you with this summary of your visit.   Please he breathe.   ·        Allergies as of Jun 05, 2017     No Known Allergies                Today's Vital Signs     BP Pulse Temp Height Weight BMI    104/52 mmHg 90 97.7 °F (36.5 °C) (Oral) 67\" 291 lb 45.57 kg/m2         Current Medications          This list Support Staff. Remember, MyChart is NOT to be used for urgent needs. For medical emergencies, dial 911.         Educational Information     Healthy Diet and Regular Exercise  [email or web address] for making healthy food choices  -   Enjoy

## (undated) NOTE — MR AVS SNAPSHOT
[facility]  [address] [id number] [phone]               Thank you for choosing us for your health care visit with OLESYA Morales. We are glad to serve you and happy to provide you with this summary of your visit.   Please h The patient indicates understanding of treatment plan and agrees to plan.     · If you develop a rash, hives, itching, throat tightness, or shortness of breath while on the antibiotic or shortly after completion, please call your PCP immediately and stop yo spray. Do not use these medicines more often than directed on the label or symptoms may get worse. You may also use tablets containing pseudoephedrine. Avoid products that combine ingredients, because side effects may be increased. Read labels.  You can als Today's Vital Signs     BP Pulse Temp Height Weight BMI    130/70 mmHg 100 98 °F (36.7 °C) (Oral) 67\" 291 lb 45.57 kg/m2         Current Medications          This list is accurate as of: 2/1/17  3:10 PM.  Always use your most recent med list. Support Staff. Remember, MyChart is NOT to be used for urgent needs. For medical emergencies, dial 911.            Visit [facility] online at  [email or web address]

## (undated) NOTE — MR AVS SNAPSHOT
[facility]  [address] [facility]  [phone]               Thank you for choosing us for your health care visit with OLESYA Leon. We are glad to serve you and happy to provide you with this summary of your visit.   Sylvia · Apply a cream or ointment to damp skin right after bathing. · Avoid things that irritate your skin. Wear absorbent, soft fabrics next to the skin rather than rough or scratchy materials. · Use mild laundry soap free of scents and perfumes.  Make sure to Atorvastatin Calcium 20 MG Tabs   Take 1 tablet (20 mg total) by mouth nightly. Commonly known as:  LIPITOR           BusPIRone HCl 15 MG Tabs   Take 2 tablets (30 mg total) by mouth daily.    Commonly known as:  BUSPAR           hydrocortisone 2.5 %

## (undated) NOTE — MR AVS SNAPSHOT
[facility]  [address] [facility] [address] [id number] [phone]               Thank you for choosing us for your health care visit with OLESYA Main. We are glad to serve you and happy to provide you with this summary of your visit.   Please hel · Apply heat to the painful areas of the face. Use a towel soaked in hot water. Or  the shower with the hot spray on your face. This is a good way to inhale warm water vapor and get heat on your face at the same time.  Cover your mouth and nose with antihistamines containing loratidine and cetirizine cause less drowsiness and may be a better choice for daytime use. · When allergies cause your sinusitis, a saline nasal rinse may give relief.  A saline nasal rinse reduces swelling and clears excess mucu Allergies as of Jan 21, 2017     No Known Allergies                Today's Vital Signs     BP Pulse Temp Height Weight BMI    114/72 mmHg 72 97.8 °F (36.6 °C) (Oral) 67\" 291 lb 12.8 oz 45.69 kg/m2         Current Medications          This list is accurate Support Staff. Remember, MyChart is NOT to be used for urgent needs. For medical emergencies, dial 911.            Visit [facility] online at  [email or web address]